# Patient Record
Sex: MALE | Race: ASIAN | NOT HISPANIC OR LATINO | URBAN - METROPOLITAN AREA
[De-identification: names, ages, dates, MRNs, and addresses within clinical notes are randomized per-mention and may not be internally consistent; named-entity substitution may affect disease eponyms.]

---

## 2024-03-06 ENCOUNTER — INPATIENT (INPATIENT)
Facility: HOSPITAL | Age: 84
LOS: 5 days | Discharge: HOME HEALTH | DRG: 281 | End: 2024-03-12
Attending: INTERNAL MEDICINE | Admitting: HOSPITALIST
Payer: MEDICARE

## 2024-03-06 VITALS
TEMPERATURE: 98 F | RESPIRATION RATE: 17 BRPM | HEART RATE: 68 BPM | OXYGEN SATURATION: 99 % | DIASTOLIC BLOOD PRESSURE: 62 MMHG | WEIGHT: 107.59 LBS | SYSTOLIC BLOOD PRESSURE: 116 MMHG

## 2024-03-06 LAB
ALBUMIN SERPL ELPH-MCNC: 4.2 G/DL — SIGNIFICANT CHANGE UP (ref 3.3–5)
ALP SERPL-CCNC: 63 U/L — SIGNIFICANT CHANGE UP (ref 40–120)
ALT FLD-CCNC: 53 U/L — HIGH (ref 10–45)
ANION GAP SERPL CALC-SCNC: 10 MMOL/L — SIGNIFICANT CHANGE UP (ref 5–17)
APTT BLD: >200 SEC — CRITICAL HIGH (ref 24.5–35.6)
AST SERPL-CCNC: 205 U/L — HIGH (ref 10–40)
BASOPHILS # BLD AUTO: 0.05 K/UL — SIGNIFICANT CHANGE UP (ref 0–0.2)
BASOPHILS NFR BLD AUTO: 0.5 % — SIGNIFICANT CHANGE UP (ref 0–2)
BILIRUB SERPL-MCNC: 0.9 MG/DL — SIGNIFICANT CHANGE UP (ref 0.2–1.2)
BUN SERPL-MCNC: 24 MG/DL — HIGH (ref 7–23)
CALCIUM SERPL-MCNC: 9.3 MG/DL — SIGNIFICANT CHANGE UP (ref 8.4–10.5)
CHLORIDE SERPL-SCNC: 105 MMOL/L — SIGNIFICANT CHANGE UP (ref 96–108)
CO2 SERPL-SCNC: 27 MMOL/L — SIGNIFICANT CHANGE UP (ref 22–31)
CREAT SERPL-MCNC: 1 MG/DL — SIGNIFICANT CHANGE UP (ref 0.5–1.3)
EGFR: 74 ML/MIN/1.73M2 — SIGNIFICANT CHANGE UP
EOSINOPHIL # BLD AUTO: 0.01 K/UL — SIGNIFICANT CHANGE UP (ref 0–0.5)
EOSINOPHIL NFR BLD AUTO: 0.1 % — SIGNIFICANT CHANGE UP (ref 0–6)
GLUCOSE SERPL-MCNC: 113 MG/DL — HIGH (ref 70–99)
HCT VFR BLD CALC: 45 % — SIGNIFICANT CHANGE UP (ref 39–50)
HGB BLD-MCNC: 14.6 G/DL — SIGNIFICANT CHANGE UP (ref 13–17)
IMM GRANULOCYTES NFR BLD AUTO: 0.2 % — SIGNIFICANT CHANGE UP (ref 0–0.9)
INR BLD: 1.15 RATIO — SIGNIFICANT CHANGE UP (ref 0.85–1.18)
LIDOCAIN IGE QN: 153 U/L — HIGH (ref 7–60)
LYMPHOCYTES # BLD AUTO: 1.66 K/UL — SIGNIFICANT CHANGE UP (ref 1–3.3)
LYMPHOCYTES # BLD AUTO: 17.5 % — SIGNIFICANT CHANGE UP (ref 13–44)
MAGNESIUM SERPL-MCNC: 2.3 MG/DL — SIGNIFICANT CHANGE UP (ref 1.6–2.6)
MCHC RBC-ENTMCNC: 30.4 PG — SIGNIFICANT CHANGE UP (ref 27–34)
MCHC RBC-ENTMCNC: 32.4 GM/DL — SIGNIFICANT CHANGE UP (ref 32–36)
MCV RBC AUTO: 93.8 FL — SIGNIFICANT CHANGE UP (ref 80–100)
MONOCYTES # BLD AUTO: 0.99 K/UL — HIGH (ref 0–0.9)
MONOCYTES NFR BLD AUTO: 10.4 % — SIGNIFICANT CHANGE UP (ref 2–14)
NEUTROPHILS # BLD AUTO: 6.77 K/UL — SIGNIFICANT CHANGE UP (ref 1.8–7.4)
NEUTROPHILS NFR BLD AUTO: 71.3 % — SIGNIFICANT CHANGE UP (ref 43–77)
NRBC # BLD: 0 /100 WBCS — SIGNIFICANT CHANGE UP (ref 0–0)
NT-PROBNP SERPL-SCNC: 3520 PG/ML — HIGH (ref 0–300)
PLATELET # BLD AUTO: 161 K/UL — SIGNIFICANT CHANGE UP (ref 150–400)
POTASSIUM SERPL-MCNC: 4 MMOL/L — SIGNIFICANT CHANGE UP (ref 3.5–5.3)
POTASSIUM SERPL-SCNC: 4 MMOL/L — SIGNIFICANT CHANGE UP (ref 3.5–5.3)
PROT SERPL-MCNC: 7.1 G/DL — SIGNIFICANT CHANGE UP (ref 6–8.3)
PROTHROM AB SERPL-ACNC: 12 SEC — SIGNIFICANT CHANGE UP (ref 9.5–13)
RBC # BLD: 4.8 M/UL — SIGNIFICANT CHANGE UP (ref 4.2–5.8)
RBC # FLD: 12.8 % — SIGNIFICANT CHANGE UP (ref 10.3–14.5)
SODIUM SERPL-SCNC: 142 MMOL/L — SIGNIFICANT CHANGE UP (ref 135–145)
TROPONIN T, HIGH SENSITIVITY RESULT: 5156 NG/L — HIGH (ref 0–51)
WBC # BLD: 9.5 K/UL — SIGNIFICANT CHANGE UP (ref 3.8–10.5)
WBC # FLD AUTO: 9.5 K/UL — SIGNIFICANT CHANGE UP (ref 3.8–10.5)

## 2024-03-06 PROCEDURE — 93308 TTE F-UP OR LMTD: CPT | Mod: 26

## 2024-03-06 PROCEDURE — 71045 X-RAY EXAM CHEST 1 VIEW: CPT | Mod: 26

## 2024-03-06 PROCEDURE — 99291 CRITICAL CARE FIRST HOUR: CPT

## 2024-03-06 RX ORDER — HEPARIN SODIUM 5000 [USP'U]/ML
INJECTION INTRAVENOUS; SUBCUTANEOUS
Qty: 25000 | Refills: 0 | Status: DISCONTINUED | OUTPATIENT
Start: 2024-03-06 | End: 2024-03-10

## 2024-03-06 RX ORDER — HEPARIN SODIUM 5000 [USP'U]/ML
2900 INJECTION INTRAVENOUS; SUBCUTANEOUS EVERY 6 HOURS
Refills: 0 | Status: DISCONTINUED | OUTPATIENT
Start: 2024-03-06 | End: 2024-03-10

## 2024-03-06 RX ORDER — CLOPIDOGREL BISULFATE 75 MG/1
300 TABLET, FILM COATED ORAL ONCE
Refills: 0 | Status: COMPLETED | OUTPATIENT
Start: 2024-03-06 | End: 2024-03-06

## 2024-03-06 RX ADMIN — CLOPIDOGREL BISULFATE 300 MILLIGRAM(S): 75 TABLET, FILM COATED ORAL at 20:24

## 2024-03-06 RX ADMIN — HEPARIN SODIUM 600 UNIT(S)/HR: 5000 INJECTION INTRAVENOUS; SUBCUTANEOUS at 20:22

## 2024-03-06 NOTE — CONSULT NOTE ADULT - ASSESSMENT
84 yo Cantonese speaking man with history of HLD who was transferred from [facility] for NICKY on EKG.     EKG: SR q waves in anterior precordial leads with NICKY in V2-v3 and submm NICKY in leads II, III, and aVF  EKG: SR Q waves in leads v2-v3 with resolved NICKY in those leads and submm NICKY in leads II, III, and aVF  Troponin: 5156  Loaded with ASA, plavix, and heparin gtt.     Presentation concerning for late presentation MI as symptoms started 2 days ago and EKG with Q waves and resolving NICKY. Will recommend treating as ACS at this time. There is no indication for emergent cath as he is not in shock, having arrhythmias and not having active chest pain.    Recommendations:  -continue ASA 81mg daily and plvix 75mg daily tomorrow   -continue atorvastatin 80mg daily   -continue heparin gtt (would continue although past 48 hours until thrombus ruled out)  -trend troponin, CK, CKMB to peak   -check formal TTE with definity to r/o thrombus   -will decide timing of LHC   -monitor on telemetry   -monitor lytes; maintain K>4, Mag >2  -check TSH, lipid profile, and A1c     Please see attending attestation for final recommendations.     Jamison Garcia MD  Cardiology Fellow  84 yo Cantonese speaking man with history of HLD who was transferred from [facility] for NICKY on EKG.     EKG: SR q waves in anterior precordial leads with NICKY in V2-v3 and submm NICKY in leads II, III, and aVF  EKG: SR Q waves in leads v2-v3 with resolved NICKY in those leads and submm NICKY in leads II, III, and aVF  Troponin: 5156  Loaded with ASA, plavix, and heparin gtt.     Presentation concerning for late presentation MI as symptoms started 2 days ago and EKG with Q waves and resolving NICKY. Will recommend treating as ACS at this time. There is no indication for emergent cath as he is not in shock, having arrhythmias and not having active chest pain.    Recommendations:  -continue ASA 81mg daily and plvix 75mg daily tomorrow   -continue atorvastatin 80mg daily   -continue heparin gtt (would continue although past 48 hours until thrombus ruled out)  -trend troponin, CK, CKMB to peak   -check formal TTE with definity to r/o thrombus   -will decide timing of LHC   -monitor on telemetry   -monitor lytes; maintain K>4, Mag >2  -check TSH, lipid profile, and A1c        Jamison Garcia MD  Cardiology Fellow

## 2024-03-06 NOTE — ED ADULT NURSE NOTE - NSFALLHARMRISKINTERV_ED_ALL_ED
Communicate risk of Fall with Harm to all staff, patient, and family/Provide patient with walking aids/Provide visual cue: red socks, yellow wristband, yellow gown, etc/Reinforce activity limits and safety measures with patient and family/Bed in lowest position, wheels locked, appropriate side rails in place/Call bell, personal items and telephone in reach/Instruct patient to call for assistance before getting out of bed/chair/stretcher/Non-slip footwear applied when patient is off stretcher/[place] to call system/Physically safe environment - no spills, clutter or unnecessary equipment/Purposeful Proactive Rounding/Room/bathroom lighting operational, light cord in reach

## 2024-03-06 NOTE — ED PROVIDER NOTE - CLINICAL SUMMARY MEDICAL DECISION MAKING FREE TEXT BOX
Audi Keita MD (PGY-3) elderly male, history of hypercholesterolemia, presented as STEMI transfer.  Limited history secondary to language and possible comprehension barriers.  His vital signs currently unremarkable, no clinical evidence of shock state.  He has ST segment elevation in his inferior leads with Q waves which may represent infarcted tissue.  POCUS ultrasound showed hypokinesis of the anterior wall extending into the anterior aspect of the septum.  Will trend troponin, continue medical therapy.  Will redose Plavix as he only got 300 at the outside facility, start heparin drip, aspirin already loaded.  Cardiology made aware.  Admission. Audi Keita MD (PGY-3) elderly male, history of hypercholesterolemia, presented as STEMI transfer.  Limited history secondary to language and possible comprehension barriers.  His vital signs currently unremarkable, no clinical evidence of shock state.  He has ST segment elevation in his inferior leads with Q waves which may represent infarcted tissue.  POCUS ultrasound showed hypokinesis of the anterior wall extending into the anterior aspect of the septum.  Will trend troponin, continue medical therapy.  Will redose Plavix as he only got 300 at the outside facility, start heparin drip, aspirin already loaded.  Cardiology made aware.  Admission.    Florencio: 83 yea rold male here with cp. transferred from [facility] for STEMI. patient with chest pain. mutiple canotnese interpeters used but states has no comprehension. loaded with plavix, asa, transferred for cards. ongoing cp. PE: att exam: patient awake alert NAD . LUNGS CTAB no wheeze no crackle. CARD RRR no m/r/g.  Abdomen soft NT ND no rebound no guarding no CVA tenderness. EXT WWP no edema no calf tenderness CV 2+DP/PT bilaterally. neuro A&Ox3 gait normal.  skin warm and dry no rash  plan: will get labs, cxr, ekg, cards, bedside echo, admit for cath. reasess

## 2024-03-06 NOTE — ED PROVIDER NOTE - OBJECTIVE STATEMENT
83-year-old male, presenting with a chief complaint of chest pain.  Very limited history, multiple Cantonese interpreters were utilized, he appears to understand Cantonese however stutters and his speech and does not provide a comprehensible history to the  services.  Per EMS, he presented with chest pain.  The report from the outside hospital was that he started having chest pain 2 days ago, saw his PCP had EKG abnormalities and was brought to [facility] via EMS.  Diagnosed with inferior STEMI, loaded with Plavix, aspirin and given heparin bolus, transferred here for specialized cardiology care, cath abilities.  Ongoing chest pain.  Review of systems otherwise unobtainable.  No known allergies to medications.  Unclear which medication he takes for his chronic comorbidities.

## 2024-03-06 NOTE — ED ADULT NURSE NOTE - OBJECTIVE STATEMENT
Patient is a 83 year old male transferred from [facility] for a positive STEMI. Patient had chest pain last night associated with SOB and vomiting went to his PCP this AM who sent him to the ER for abnormal EKG. Patient was given Aspirin 325mg, Plavix 300mg, Morphine IV push 2mg, and Heparin Bolus 3000 units. On assessment patient is A&Ox3, breathing comfortably on room air, no accessory muscle use, no cough, chest rise and fall equal, NSR on the cardiac monitor, no JVD, no edema noted, strong bilateral peripheral pulses, Abdomen is soft, nondistended, nontender, skin warm and normal for race. Patient denies headache, dizziness, chest pain, palpitations, cough, SOB, abdominal pain, n/v/d, urinary symptoms, fevers, chills, weakness at this time.

## 2024-03-06 NOTE — ED PROVIDER NOTE - PHYSICAL EXAMINATION
Gen: NAD, non-toxic appearing  Head: normal appearing  HEENT: normal conjunctiva  Lung: no respiratory distress, speaking in full sentences, ctab      CV: regular rate and rhythm, no murmurs  Abd: soft, non distended, non tender   MSK: no visible deformities  Neuro: alert and grossly oriented, no gross motor deficits  Skin: No king rashes

## 2024-03-06 NOTE — CONSULT NOTE ADULT - SUBJECTIVE AND OBJECTIVE BOX
HPI:  84 yo Cantonese speaking man with history of HLD who was transferred from [facility] for NICKY on EKG.     Daughter present for encounter.   Reports intermittent chest pain that started 2 days prior. Midsternal radiated to back. Squeezing, pressure like pain. Came on with rest and with exertion. Lasted minutes at a time. Not worsened by exertion. No known aggravating/relieving factors. 1 episode of NBNB emesis yesterday. Denies palpitations, orthopnea, SOB, diaphoresis, falls, and passing out. 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 Remote history of pipe tobacco use. Denies recreational drug use and alcohol use. Only takes atorvastatin and laxatives at home. No prior known cardiac hx.      At [facility] VSS. EKS with SR q waves in anterior precordial leads with NICKY in V2-v3 and submm NICKY in leads II, III, and aVF.   Loaded with ASA, plavix 300mg, and heparin gtt. Given morphine for pain.   Transferred for cath to NS.     Upon arrival to [facility] VSS. Given additional 300mg plavix. continued on heparin gtt. POCUS done by ED with anterior wall and raffy-septum hypokinetic. No chest pain.   Repeat EKG with SR Q waves in leads v2-v3 with resolved NICKY in those leads and submm NICKY in leads II, III, and aVF.      Current Medications:   heparin   Injectable 2900 Unit(s) IV Push every 6 hours PRN  heparin  Infusion.  Unit(s)/Hr IV Continuous <Continuous>      REVIEW OF SYSTEMS:  as above.     Physical Exam:  T(F): 98.2 (03-06), Max: 98.2 (03-06)  HR: 69 (03-06) (68 - 69)  BP: 119/62 (03-06) (116/62 - 119/62)  RR: --  SpO2: --    GENERAL: No acute distress, well-developed  HEAD:  Atraumatic, Normocephalic  ENT: EOMI, PERRLA, conjunctiva and sclera clear, Neck supple, No JVD, moist mucosa  CHEST/LUNG: Clear to auscultation bilaterally; No wheeze, equal breath sounds bilaterally   BACK: No spinal tenderness  HEART: Regular rate and rhythm; No murmurs, rubs, or gallops  ABDOMEN: Soft, Nontender, Nondistended; Bowel sounds present  EXTREMITIES:  No clubbing, cyanosis, or edema  PSYCH: Nl behavior, nl affect  NEUROLOGY: AAOx3, non-focal, cranial nerves intact  SKIN: Normal color, No rashes or lesions  LINES:    Imaging:    CXR: Personally reviewed    Labs: Personally reviewed                        14.6   9.50  )-----------( 161      ( 06 Mar 2024 20:12 )             45.0     03-06    142  |  105  |  24<H>  ----------------------------<  113<H>  4.0   |  27  |  1.00    Ca    9.3      06 Mar 2024 20:12  Mg     2.3     03-06    TPro  7.1  /  Alb  4.2  /  TBili  0.9  /  DBili  x   /  AST  205<H>  /  ALT  53<H>  /  AlkPhos  63  03-06    PT/INR - ( 06 Mar 2024 20:12 )   PT: 12.0 sec;   INR: 1.15 ratio             CARDIAC MARKERS ( 06 Mar 2024 20:12 )  5156 ng/L / x     / x     / x     / x     / x                     HPI:  82 yo Cantonese speaking man with history of HLD who was transferred from [facility] for NICKY on EKG.     Daughter present for encounter.   Reports intermittent chest pain that started 2 days prior. Midsternal radiated to back. Squeezing, pressure like pain. Came on with rest and with exertion. Lasted minutes at a time. Not worsened by exertion. No known aggravating/relieving factors. 1 episode of NBNB emesis yesterday. Denies palpitations, orthopnea, SOB, diaphoresis, falls, and passing out. 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 Remote history of pipe tobacco use. Denies recreational drug use and alcohol use. Only takes atorvastatin and laxatives at home. No prior known cardiac hx.      At [facility] VSS. EKS with SR q waves in anterior precordial leads with NICKY in V2-v3 and submm NICKY in leads II, III, and aVF.   Loaded with ASA, plavix 300mg, and heparin gtt. Given morphine for pain.   Transferred for cath to NS.     Upon arrival to [facility] VSS. Given additional 300mg plavix. continued on heparin gtt. POCUS done by ED with anterior wall and raffy-septum hypokinetic. No chest pain.   Repeat EKG with SR Q waves in leads v2-v3 with resolved NICKY in those leads and submm NICKY in leads II, III, and aVF.      Current Medications:   heparin   Injectable 2900 Unit(s) IV Push every 6 hours PRN  heparin  Infusion.  Unit(s)/Hr IV Continuous <Continuous>      REVIEW OF SYSTEMS:  CONSTITUTIONAL: no fevers or chills  EYES/ENT: No visual changes;  No vertigo or throat pain   NECK: No pain or stiffness  RESPIRATORY: No cough, wheezing. No shortness of breath  CARDIOVASCULAR: No chest pain or palpitations  GASTROINTESTINAL: No abdominal or epigastric pain. No nausea, vomiting. No diarrhea. No melena.  GENITOURINARY: No dysuria, frequency or hematuria  NEUROLOGICAL: No numbness or weakness  SKIN: No itching, burning, rashes, or lesions   All other review of systems is negative unless indicated above. .     Physical Exam:  T(F): 98.2 (03-06), Max: 98.2 (03-06)  HR: 69 (03-06) (68 - 69)  BP: 119/62 (03-06) (116/62 - 119/62)  RR: --  SpO2: --    GENERAL: No acute distress, well-developed  HEAD:  Atraumatic, Normocephalic  ENT: EOMI, PERRLA, conjunctiva and sclera clear, Neck supple, No JVD, moist mucosa  CHEST/LUNG: Clear to auscultation bilaterally; No wheeze, equal breath sounds bilaterally   BACK: No spinal tenderness  HEART: Regular rate and rhythm; No murmurs, rubs, or gallops  ABDOMEN: Soft, Nontender, Nondistended; Bowel sounds present  EXTREMITIES:  No clubbing, cyanosis, or edema  PSYCH: Nl behavior, nl affect  NEUROLOGY: AAOx3, non-focal, cranial nerves intact  SKIN: Normal color, No rashes or lesions  LINES:    Imaging:    CXR: Personally reviewed    Labs: Personally reviewed                        14.6   9.50  )-----------( 161      ( 06 Mar 2024 20:12 )             45.0     03-06    142  |  105  |  24<H>  ----------------------------<  113<H>  4.0   |  27  |  1.00    Ca    9.3      06 Mar 2024 20:12  Mg     2.3     03-06    TPro  7.1  /  Alb  4.2  /  TBili  0.9  /  DBili  x   /  AST  205<H>  /  ALT  53<H>  /  AlkPhos  63  03-06    PT/INR - ( 06 Mar 2024 20:12 )   PT: 12.0 sec;   INR: 1.15 ratio             CARDIAC MARKERS ( 06 Mar 2024 20:12 )  5156 ng/L / x     / x     / x     / x     / x

## 2024-03-06 NOTE — CONSULT NOTE ADULT - ATTENDING COMMENTS
83 year old man transferred from [facility], late presentation MI, symptoms resolved prior to transfer, troponin appears to have peaked and is declining. He is in no distress, lungs are clear, cardiac rhythm regular without murmurs and there is no edema. EKG consistent with anterior wall STEMI. On Heparin infusion, loaded with aspirin and ticagrelor. Plan cardiac echo then coronary angiography.    To contact call Cardiology Fellow or Attending as listed on [email or web address] password: [email or web address]. 83 year old man transferred from [facility], late presentation MI, symptoms resolved prior to transfer, troponin appears to have peaked and is declining. He is in no distress, lungs are clear, cardiac rhythm regular without murmurs and there is no edema. EKG consistent with anterior wall STEMI. On Heparin infusion, loaded with aspirin and clopidogrel. Plan cardiac echo then coronary angiography.    To contact call Cardiology Fellow or Attending as listed on [email or web address] password: [email or web address].

## 2024-03-06 NOTE — ED PROVIDER NOTE - PROGRESS NOTE DETAILS
Audi Keita MD (PGY-3)  cardiology evaluated, no plan for This evening.  Continue medical therapy.  Telemetry bed admission.

## 2024-03-07 DIAGNOSIS — I24.9 ACUTE ISCHEMIC HEART DISEASE, UNSPECIFIED: ICD-10-CM

## 2024-03-07 DIAGNOSIS — Z79.899 OTHER LONG TERM (CURRENT) DRUG THERAPY: ICD-10-CM

## 2024-03-07 DIAGNOSIS — I21.3 ST ELEVATION (STEMI) MYOCARDIAL INFARCTION OF UNSPECIFIED SITE: ICD-10-CM

## 2024-03-07 LAB
A1C WITH ESTIMATED AVERAGE GLUCOSE RESULT: 6.1 % — HIGH (ref 4–5.6)
ADD ON TEST-SPECIMEN IN LAB: SIGNIFICANT CHANGE UP
ALBUMIN SERPL ELPH-MCNC: 3.9 G/DL — SIGNIFICANT CHANGE UP (ref 3.3–5)
ALP SERPL-CCNC: 58 U/L — SIGNIFICANT CHANGE UP (ref 40–120)
ALT FLD-CCNC: 43 U/L — SIGNIFICANT CHANGE UP (ref 10–45)
ANION GAP SERPL CALC-SCNC: 8 MMOL/L — SIGNIFICANT CHANGE UP (ref 5–17)
APTT BLD: 102 SEC — HIGH (ref 24.5–35.6)
APTT BLD: 43.4 SEC — HIGH (ref 24.5–35.6)
APTT BLD: 54.6 SEC — HIGH (ref 24.5–35.6)
AST SERPL-CCNC: 141 U/L — HIGH (ref 10–40)
BASOPHILS # BLD AUTO: 0.04 K/UL — SIGNIFICANT CHANGE UP (ref 0–0.2)
BASOPHILS NFR BLD AUTO: 0.6 % — SIGNIFICANT CHANGE UP (ref 0–2)
BILIRUB SERPL-MCNC: 1.3 MG/DL — HIGH (ref 0.2–1.2)
BUN SERPL-MCNC: 22 MG/DL — SIGNIFICANT CHANGE UP (ref 7–23)
CALCIUM SERPL-MCNC: 9.3 MG/DL — SIGNIFICANT CHANGE UP (ref 8.4–10.5)
CHLORIDE SERPL-SCNC: 106 MMOL/L — SIGNIFICANT CHANGE UP (ref 96–108)
CHOLEST SERPL-MCNC: 146 MG/DL — SIGNIFICANT CHANGE UP
CK SERPL-CCNC: 851 U/L — HIGH (ref 30–200)
CO2 SERPL-SCNC: 26 MMOL/L — SIGNIFICANT CHANGE UP (ref 22–31)
CREAT SERPL-MCNC: 1.05 MG/DL — SIGNIFICANT CHANGE UP (ref 0.5–1.3)
EGFR: 70 ML/MIN/1.73M2 — SIGNIFICANT CHANGE UP
EOSINOPHIL # BLD AUTO: 0.02 K/UL — SIGNIFICANT CHANGE UP (ref 0–0.5)
EOSINOPHIL NFR BLD AUTO: 0.3 % — SIGNIFICANT CHANGE UP (ref 0–6)
ESTIMATED AVERAGE GLUCOSE: 128 MG/DL — HIGH (ref 68–114)
GLUCOSE SERPL-MCNC: 99 MG/DL — SIGNIFICANT CHANGE UP (ref 70–99)
HCT VFR BLD CALC: 40.4 % — SIGNIFICANT CHANGE UP (ref 39–50)
HCT VFR BLD CALC: 40.8 % — SIGNIFICANT CHANGE UP (ref 39–50)
HDLC SERPL-MCNC: 56 MG/DL — SIGNIFICANT CHANGE UP
HGB BLD-MCNC: 12.8 G/DL — LOW (ref 13–17)
HGB BLD-MCNC: 13.4 G/DL — SIGNIFICANT CHANGE UP (ref 13–17)
IMM GRANULOCYTES NFR BLD AUTO: 0.6 % — SIGNIFICANT CHANGE UP (ref 0–0.9)
INR BLD: 1.07 RATIO — SIGNIFICANT CHANGE UP (ref 0.85–1.18)
LIPID PNL WITH DIRECT LDL SERPL: 76 MG/DL — SIGNIFICANT CHANGE UP
LYMPHOCYTES # BLD AUTO: 1.46 K/UL — SIGNIFICANT CHANGE UP (ref 1–3.3)
LYMPHOCYTES # BLD AUTO: 20.9 % — SIGNIFICANT CHANGE UP (ref 13–44)
MAGNESIUM SERPL-MCNC: 2.3 MG/DL — SIGNIFICANT CHANGE UP (ref 1.6–2.6)
MCHC RBC-ENTMCNC: 30 PG — SIGNIFICANT CHANGE UP (ref 27–34)
MCHC RBC-ENTMCNC: 30.6 PG — SIGNIFICANT CHANGE UP (ref 27–34)
MCHC RBC-ENTMCNC: 31.7 GM/DL — LOW (ref 32–36)
MCHC RBC-ENTMCNC: 32.8 GM/DL — SIGNIFICANT CHANGE UP (ref 32–36)
MCV RBC AUTO: 93.2 FL — SIGNIFICANT CHANGE UP (ref 80–100)
MCV RBC AUTO: 94.8 FL — SIGNIFICANT CHANGE UP (ref 80–100)
MONOCYTES # BLD AUTO: 0.68 K/UL — SIGNIFICANT CHANGE UP (ref 0–0.9)
MONOCYTES NFR BLD AUTO: 9.7 % — SIGNIFICANT CHANGE UP (ref 2–14)
NEUTROPHILS # BLD AUTO: 4.76 K/UL — SIGNIFICANT CHANGE UP (ref 1.8–7.4)
NEUTROPHILS NFR BLD AUTO: 67.9 % — SIGNIFICANT CHANGE UP (ref 43–77)
NON HDL CHOLESTEROL: 90 MG/DL — SIGNIFICANT CHANGE UP
NRBC # BLD: 0 /100 WBCS — SIGNIFICANT CHANGE UP (ref 0–0)
NRBC # BLD: 0 /100 WBCS — SIGNIFICANT CHANGE UP (ref 0–0)
PHOSPHATE SERPL-MCNC: 2.1 MG/DL — LOW (ref 2.5–4.5)
PLATELET # BLD AUTO: 134 K/UL — LOW (ref 150–400)
PLATELET # BLD AUTO: 146 K/UL — LOW (ref 150–400)
POTASSIUM SERPL-MCNC: 4.4 MMOL/L — SIGNIFICANT CHANGE UP (ref 3.5–5.3)
POTASSIUM SERPL-SCNC: 4.4 MMOL/L — SIGNIFICANT CHANGE UP (ref 3.5–5.3)
PROT SERPL-MCNC: 6.5 G/DL — SIGNIFICANT CHANGE UP (ref 6–8.3)
PROTHROM AB SERPL-ACNC: 11.7 SEC — SIGNIFICANT CHANGE UP (ref 9.5–13)
RBC # BLD: 4.26 M/UL — SIGNIFICANT CHANGE UP (ref 4.2–5.8)
RBC # BLD: 4.38 M/UL — SIGNIFICANT CHANGE UP (ref 4.2–5.8)
RBC # FLD: 12.8 % — SIGNIFICANT CHANGE UP (ref 10.3–14.5)
RBC # FLD: 12.9 % — SIGNIFICANT CHANGE UP (ref 10.3–14.5)
SODIUM SERPL-SCNC: 140 MMOL/L — SIGNIFICANT CHANGE UP (ref 135–145)
TRIGL SERPL-MCNC: 73 MG/DL — SIGNIFICANT CHANGE UP
TROPONIN T, HIGH SENSITIVITY RESULT: 4083 NG/L — HIGH (ref 0–51)
TSH SERPL-MCNC: 1.48 UIU/ML — SIGNIFICANT CHANGE UP (ref 0.27–4.2)
WBC # BLD: 7 K/UL — SIGNIFICANT CHANGE UP (ref 3.8–10.5)
WBC # BLD: 8.02 K/UL — SIGNIFICANT CHANGE UP (ref 3.8–10.5)
WBC # FLD AUTO: 7 K/UL — SIGNIFICANT CHANGE UP (ref 3.8–10.5)
WBC # FLD AUTO: 8.02 K/UL — SIGNIFICANT CHANGE UP (ref 3.8–10.5)

## 2024-03-07 PROCEDURE — 99223 1ST HOSP IP/OBS HIGH 75: CPT

## 2024-03-07 PROCEDURE — 99223 1ST HOSP IP/OBS HIGH 75: CPT | Mod: GC

## 2024-03-07 PROCEDURE — 93306 TTE W/DOPPLER COMPLETE: CPT | Mod: 26

## 2024-03-07 RX ORDER — POLYETHYLENE GLYCOL 3350 17 G/17G
17 POWDER, FOR SOLUTION ORAL
Refills: 0 | DISCHARGE

## 2024-03-07 RX ORDER — SENNA PLUS 8.6 MG/1
2 TABLET ORAL
Refills: 0 | DISCHARGE

## 2024-03-07 RX ORDER — ACETAMINOPHEN 500 MG
650 TABLET ORAL EVERY 6 HOURS
Refills: 0 | Status: DISCONTINUED | OUTPATIENT
Start: 2024-03-07 | End: 2024-03-12

## 2024-03-07 RX ORDER — NITROGLYCERIN 6.5 MG
0.4 CAPSULE, EXTENDED RELEASE ORAL
Refills: 0 | Status: DISCONTINUED | OUTPATIENT
Start: 2024-03-07 | End: 2024-03-07

## 2024-03-07 RX ORDER — WARFARIN SODIUM 2.5 MG/1
5 TABLET ORAL ONCE
Refills: 0 | Status: COMPLETED | OUTPATIENT
Start: 2024-03-07 | End: 2024-03-07

## 2024-03-07 RX ORDER — ONDANSETRON 8 MG/1
4 TABLET, FILM COATED ORAL EVERY 8 HOURS
Refills: 0 | Status: DISCONTINUED | OUTPATIENT
Start: 2024-03-07 | End: 2024-03-12

## 2024-03-07 RX ORDER — POLYETHYLENE GLYCOL 3350 17 G/17G
17 POWDER, FOR SOLUTION ORAL DAILY
Refills: 0 | Status: DISCONTINUED | OUTPATIENT
Start: 2024-03-07 | End: 2024-03-12

## 2024-03-07 RX ORDER — ATORVASTATIN CALCIUM 80 MG/1
80 TABLET, FILM COATED ORAL AT BEDTIME
Refills: 0 | Status: DISCONTINUED | OUTPATIENT
Start: 2024-03-07 | End: 2024-03-12

## 2024-03-07 RX ORDER — TAMSULOSIN HYDROCHLORIDE 0.4 MG/1
0.4 CAPSULE ORAL AT BEDTIME
Refills: 0 | Status: DISCONTINUED | OUTPATIENT
Start: 2024-03-07 | End: 2024-03-12

## 2024-03-07 RX ORDER — POTASSIUM PHOSPHATE, MONOBASIC POTASSIUM PHOSPHATE, DIBASIC 236; 224 MG/ML; MG/ML
15 INJECTION, SOLUTION INTRAVENOUS ONCE
Refills: 0 | Status: COMPLETED | OUTPATIENT
Start: 2024-03-07 | End: 2024-03-07

## 2024-03-07 RX ORDER — SENNA PLUS 8.6 MG/1
2 TABLET ORAL AT BEDTIME
Refills: 0 | Status: DISCONTINUED | OUTPATIENT
Start: 2024-03-07 | End: 2024-03-12

## 2024-03-07 RX ORDER — LANOLIN ALCOHOL/MO/W.PET/CERES
3 CREAM (GRAM) TOPICAL AT BEDTIME
Refills: 0 | Status: DISCONTINUED | OUTPATIENT
Start: 2024-03-07 | End: 2024-03-12

## 2024-03-07 RX ORDER — CLOPIDOGREL BISULFATE 75 MG/1
75 TABLET, FILM COATED ORAL DAILY
Refills: 0 | Status: DISCONTINUED | OUTPATIENT
Start: 2024-03-07 | End: 2024-03-12

## 2024-03-07 RX ORDER — ASPIRIN/CALCIUM CARB/MAGNESIUM 324 MG
81 TABLET ORAL DAILY
Refills: 0 | Status: DISCONTINUED | OUTPATIENT
Start: 2024-03-07 | End: 2024-03-08

## 2024-03-07 RX ADMIN — POLYETHYLENE GLYCOL 3350 17 GRAM(S): 17 POWDER, FOR SOLUTION ORAL at 12:27

## 2024-03-07 RX ADMIN — SENNA PLUS 2 TABLET(S): 8.6 TABLET ORAL at 21:06

## 2024-03-07 RX ADMIN — HEPARIN SODIUM 450 UNIT(S)/HR: 5000 INJECTION INTRAVENOUS; SUBCUTANEOUS at 07:06

## 2024-03-07 RX ADMIN — Medication 81 MILLIGRAM(S): at 12:27

## 2024-03-07 RX ADMIN — HEPARIN SODIUM 550 UNIT(S)/HR: 5000 INJECTION INTRAVENOUS; SUBCUTANEOUS at 14:42

## 2024-03-07 RX ADMIN — HEPARIN SODIUM 450 UNIT(S)/HR: 5000 INJECTION INTRAVENOUS; SUBCUTANEOUS at 04:10

## 2024-03-07 RX ADMIN — WARFARIN SODIUM 5 MILLIGRAM(S): 2.5 TABLET ORAL at 21:06

## 2024-03-07 RX ADMIN — Medication 3 MILLIGRAM(S): at 21:06

## 2024-03-07 RX ADMIN — HEPARIN SODIUM 550 UNIT(S)/HR: 5000 INJECTION INTRAVENOUS; SUBCUTANEOUS at 23:20

## 2024-03-07 RX ADMIN — POTASSIUM PHOSPHATE, MONOBASIC POTASSIUM PHOSPHATE, DIBASIC 62.5 MILLIMOLE(S): 236; 224 INJECTION, SOLUTION INTRAVENOUS at 12:28

## 2024-03-07 RX ADMIN — CLOPIDOGREL BISULFATE 75 MILLIGRAM(S): 75 TABLET, FILM COATED ORAL at 12:27

## 2024-03-07 RX ADMIN — ATORVASTATIN CALCIUM 80 MILLIGRAM(S): 80 TABLET, FILM COATED ORAL at 21:06

## 2024-03-07 RX ADMIN — HEPARIN SODIUM 0 UNIT(S)/HR: 5000 INJECTION INTRAVENOUS; SUBCUTANEOUS at 03:04

## 2024-03-07 RX ADMIN — TAMSULOSIN HYDROCHLORIDE 0.4 MILLIGRAM(S): 0.4 CAPSULE ORAL at 21:06

## 2024-03-07 NOTE — H&P ADULT - PROBLEM SELECTOR PLAN 1
currently still with some chest pain; ekg with q waves and resolving panfilo in ant precordial leads v2-v4 + sub mm panfilo in inferior limb leads ii, iii, avf; trop ~5k (also the likely source of transaminitis)  clinically euvolemic; bnp ~3500; cxr prelim read w clear lungs; pocus in er shows septal and anterior wma  otherwise, in no respiratory distress w adequate spo2 at room air  s/p dapt load + ufh infusion at osh and [facility] er  cards consulted by er, "will decide timing of LHC"  follow up 2nd trop, cpk, formal tte, lipid profile, a1c  Monitor for chest pain, telemetry/EKG changes  trend volume status via I/Os, daily weights  cont dapt, high intensity statin  cont ufh infusion  prn nitroglycerin sl for recurrent chest pain  cards follow up in am

## 2024-03-07 NOTE — CHART NOTE - NSCHARTNOTEFT_GEN_A_CORE
See complete consult note from 3/6. 83 year old man transferred from [facility], late presentation MI, symptoms resolved prior to transfer, troponin appears to have peaked and is declining. He is in no distress, lungs are clear, cardiac rhythm regular without murmurs and there is no edema. EKG consistent with anterior wall STEMI. On Heparin infusion, loaded with aspirin and clopidogrel. Plan cardiac echo this morning then coronary angiography.

## 2024-03-07 NOTE — CHART NOTE - NSCHARTNOTEFT_GEN_A_CORE
TTE resulted with EF 40%, SWMAs, apical LV thrombus. Discussed case with interventional attending Dr Awad, due to patients age, late presentation, and lack of current symptoms, arrhythmias, or shock, risk of mechanical complications from LHC/PCI likely outweighs benefits of medical management. Dr Awad recommends differing LHC and treating medically.    Plan:  - Cont DAPT for now and heparin gtt. Likely transition to Warfarin for LV thrombus  - Cont high intensity statin  - Introduction of GDMT as BP and HR allow. Currently with borderline vitals, will hold off on starting GDMT pending clinical course    Nader Jose MD  Cardiology Fellow TTE resulted with EF 40%, SWMAs, apical LV thrombus. Discussed case with interventional attending Dr Awad, due to patients age, late presentation, and lack of current symptoms, arrhythmias, or shock, risk of mechanical complications from LHC/PCI likely outweighs benefits of medical management. Dr Awad recommends differing LHC and treating medically.    Plan:  - Cont DAPT for now and heparin gtt. Likely transition to Warfarin for LV thrombus. After 48 hr of triple therapy, stop ASA, patient to continue on Plavix and AC with Warfarin/heparin bridge  - Cont high intensity statin  - Introduction of GDMT as BP and HR allow. Currently with borderline vitals, will hold off on starting GDMT pending clinical course    Nader Jose MD  Cardiology Fellow

## 2024-03-07 NOTE — H&P ADULT - TIME BILLING
- Ordering, reviewing, and interpreting labs, testing, and imaging.  - Independently obtaining a review of systems and performing a physical exam  - Reviewing consultant documentation/recommendations

## 2024-03-07 NOTE — H&P ADULT - NSHPPHYSICALEXAM_GEN_ALL_CORE
T(C): 36.7 (03-07-24 @ 00:01), Max: 36.8 (03-06-24 @ 20:08)  HR: 68 (03-07-24 @ 00:01) (68 - 69)  BP: 99/57 (03-07-24 @ 00:01) (99/57 - 119/62)  RR: 17 (03-07-24 @ 00:01) (17 - 17)  SpO2: 99% (03-07-24 @ 00:01) (99% - 99%)  GENERAL: NAD, lying in bed   EYES: EOMI, PERRLA; conjunctiva and sclera clear  ENMT: Moist oral mucosa, no pharyngeal injection or exudates   NECK: Supple, no palpable masses; no JVD  RESPIRATORY: Normal respiratory effort; lungs are clear to auscultation bilaterally  CARDIOVASCULAR: Regular rate and rhythm, normal S1 and S2, no murmur/rub/gallop; No lower extremity edema; Peripheral pulses are 2+ bilaterally  ABDOMEN: Nontender to palpation, normoactive bowel sounds, no rebound/guarding;   MUSCULOSKELETAL: no joint swelling or tenderness to palpation  PSYCH: A+O to person, place, and time; affect appropriate  NEUROLOGY: CN 2-12 are intact and symmetric; no gross motor or sensory deficits   SKIN: No rashes; no palpable lesions

## 2024-03-07 NOTE — PHYSICAL THERAPY INITIAL EVALUATION ADULT - LEVEL OF INDEPENDENCE: SUPINE/SIT, REHAB EVAL
Patient would like to become a new patient of Dr. Foster.    His fiance (Charisma Bolton (independent

## 2024-03-07 NOTE — OCCUPATIONAL THERAPY INITIAL EVALUATION ADULT - PERTINENT HX OF CURRENT PROBLEM, REHAB EVAL
82yo 50kg m w pmh hld presented to osh with chest pain; reportedly, started a couple of days ago, substernal, radiating to back, characterized as squeezing, occurring even at rest. family grew concerned so had patient go to osh er; at osh er, found to have findings suggestive of stemi (ekg w panfilo in anterior precordial leads + elevated troponin); patient was loaded w dapt, started on ufh infusion, and transferred to [facility] er for further mgmt; in er, c/f late presentation stemi (repeat ekg w q waves and resolving panfilo in ant precordial leads v2-v4 + elevated troponin); admit to medicine for further mgmt.   ER TTE LIMITED- No Pericardial Effusion. XRAY Chest (-).

## 2024-03-07 NOTE — H&P ADULT - ASSESSMENT
82yo 50kg m w pmh hld presented to osh with chest pain; at osh er, found to have findings suggestive of stemi (ekg w panfilo in anterior precordial leads + elevated troponin); loaded w dapt, started on ufh infusion, and transferred to [facility] er for further mgmt; in er, c/f late presentation stemi (repeat ekg w q waves and resolving panfilo in ant precordial leads v2-v3 + elevated troponin); admit to medicine for further mgmt 84yo 50kg m w pmh hld presented to osh with chest pain; at osh er, found to have findings suggestive of stemi (ekg w panfilo in anterior precordial leads + elevated troponin); loaded w dapt, started on ufh infusion, and transferred to [facility] er for further mgmt; in er, c/f late presentation stemi (repeat ekg w q waves and resolving panfilo in ant precordial leads v2-v4 + elevated troponin); admit to medicine for further mgmt

## 2024-03-07 NOTE — OCCUPATIONAL THERAPY INITIAL EVALUATION ADULT - ADDITIONAL COMMENTS
Pt resides in an apartment on 6th floor with his family(+) elevator access. PTA, pt was independent with all mobility/ADLs. Pt does not use/own any DME.

## 2024-03-07 NOTE — PHYSICAL THERAPY INITIAL EVALUATION ADULT - ADDITIONAL COMMENTS
Pt resides in an apartment with his family. (+) elevator access. PTA, pt was independent with all mobility/ADLs. Pt does not use/own any DME.

## 2024-03-07 NOTE — CHART NOTE - NSCHARTNOTEFT_GEN_A_CORE
For full details see H&P from earlier today.    83 year old male with PMH HLD and BPH who presented with chest pain. He initially went to an outside hospital and found to have late presenting STEMI and was transferred to [facility]. He was evaluated by cardiology but due to age, late presentation and patient's improvement tin symptoms risks of LHC outweigh the benefits so he will be medically managed. He was also found to have an LV thrombus, will continue with heparin drip as a bridge to Warfarin. Will continue with DAPT and high intensity statin. Will introduce GDMT as tolerated.

## 2024-03-07 NOTE — H&P ADULT - NSHPREVIEWOFSYSTEMS_GEN_ALL_CORE
CONSTITUTIONAL: No fever. no weakness  ENMT:  No sinus or throat pain  RESPIRATORY: No cough, wheezing, chills or hemoptysis; No shortness of breath  CARDIOVASCULAR: + chest pain, no palpitations, dizziness, or leg swelling  GASTROINTESTINAL: No abdominal or epigastric pain. No nausea, vomiting, or hematemesis; No diarrhea or constipation. No melena or hematochezia.  GENITOURINARY: No dysuria or incontinence  NEUROLOGICAL: No headaches, memory loss, loss of strength, numbness, or tremors  SKIN: No rashes,  No hives or eczema  ENDOCRINE: No heat or cold intolerance; No hair loss  MUSCULOSKELETAL: No joint pain or swelling; No muscle, back, or extremity pain  PSYCHIATRIC: No depression, anxiety, mood swings, or difficulty sleeping  HEME/LYMPH: No easy bruising, or bleeding gums; no enlarged LN

## 2024-03-07 NOTE — OCCUPATIONAL THERAPY INITIAL EVALUATION ADULT - VISUAL ACUITY
no visual impairment noted pt. able to navigate environment safely ,[email or web address]

## 2024-03-07 NOTE — PHYSICAL THERAPY INITIAL EVALUATION ADULT - PERTINENT HX OF CURRENT PROBLEM, REHAB EVAL
84yo 50kg m w pmh hld presented to [facility] with chest pain; reportedly, started a couple of days ago, substernal, radiating to back, characterized as squeezing, occurring even at rest. family grew concerned so had patient go to osh er; at osh er, found to have findings suggestive of stemi (ekg w panfilo in anterior precordial leads + elevated troponin); patient was loaded w dapt, started on ufh infusion, and transferred to [facility] er for further mgmt; in er, c/f late presentation stemi (repeat ekg w q waves and resolving panfilo in ant precordial leads v2-v4 + elevated troponin); admit to medicine for further mgmt. TTE (-) for pericardial effusion.

## 2024-03-07 NOTE — PATIENT PROFILE ADULT - FALL HARM RISK - HARM RISK INTERVENTIONS
Assistance with ambulation/Assistance OOB with selected safe patient handling equipment/Communicate Risk of Fall with Harm to all staff/Discuss with provider need for PT consult/Monitor gait and stability/Provide patient with walking aids - walker, cane, crutches/Reinforce activity limits and safety measures with patient and family/Tailored Fall Risk Interventions/Use of alarms - bed, chair and/or voice tab/Visual Cue: Yellow wristband and red socks/Bed in lowest position, wheels locked, appropriate side rails in place/Call bell, personal items and telephone in reach/Instruct patient to call for assistance before getting out of bed or chair/Non-slip footwear when patient is out of bed/[place] to call system/Physically safe environment - no spills, clutter or unnecessary equipment/Purposeful Proactive Rounding/Room/bathroom lighting operational, light cord in reach

## 2024-03-07 NOTE — H&P ADULT - HISTORY OF PRESENT ILLNESS
82yo 50kg m w pmh hld presented to osh with chest pain; reportedly, started a couple of days ago, substernal, radiating to back, characterized as squeezing, occurring even at rest. family grew concerned so had patient go to osh er; at osh er, found to have findings suggestive of stemi (ekg w panfilo in anterior precordial leads + elevated troponin); patient was loaded w dapt, started on ufh infusion, and transferred to [facility] er for further mgmt; in er, c/f late presentation stemi (repeat ekg w q waves and resolving panfilo in ant precordial leads v2-v3 + elevated troponin); admit to medicine for further mgmt        82yo 50kg m w pmh hld presented to o[facility] with chest pain; reportedly, started a couple of days ago, substernal, radiating to back, characterized as squeezing, occurring even at rest. family grew concerned so had patient go to osh er; at osh er, found to have findings suggestive of stemi (ekg w panfilo in anterior precordial leads + elevated troponin); patient was loaded w dapt, started on ufh infusion, and transferred to [facility] er for further mgmt; in er, c/f late presentation stemi (repeat ekg w q waves and resolving panfilo in ant precordial leads v2-v3 + elevated troponin); admit to medicine for further mgmt 82yo 50kg m w pmh hld presented to o[facility] with chest pain; reportedly, started a couple of days ago, substernal, radiating to back, characterized as squeezing, occurring even at rest. family grew concerned so had patient go to osh er; at osh er, found to have findings suggestive of stemi (ekg w panfilo in anterior precordial leads + elevated troponin); patient was loaded w dapt, started on ufh infusion, and transferred to [facility] er for further mgmt; in er, c/f late presentation stemi (repeat ekg w q waves and resolving panfilo in ant precordial leads v2-v4 + elevated troponin); admit to medicine for further mgmt

## 2024-03-08 DIAGNOSIS — I51.3 INTRACARDIAC THROMBOSIS, NOT ELSEWHERE CLASSIFIED: ICD-10-CM

## 2024-03-08 DIAGNOSIS — N40.0 BENIGN PROSTATIC HYPERPLASIA WITHOUT LOWER URINARY TRACT SYMPTOMS: ICD-10-CM

## 2024-03-08 LAB
ALBUMIN SERPL ELPH-MCNC: 3.6 G/DL — SIGNIFICANT CHANGE UP (ref 3.3–5)
ALP SERPL-CCNC: 57 U/L — SIGNIFICANT CHANGE UP (ref 40–120)
ALT FLD-CCNC: 40 U/L — SIGNIFICANT CHANGE UP (ref 10–45)
ANION GAP SERPL CALC-SCNC: 11 MMOL/L — SIGNIFICANT CHANGE UP (ref 5–17)
APTT BLD: 50.5 SEC — HIGH (ref 24.5–35.6)
APTT BLD: 62 SEC — HIGH (ref 24.5–35.6)
APTT BLD: 67.5 SEC — HIGH (ref 24.5–35.6)
AST SERPL-CCNC: 85 U/L — HIGH (ref 10–40)
BILIRUB SERPL-MCNC: 1 MG/DL — SIGNIFICANT CHANGE UP (ref 0.2–1.2)
BUN SERPL-MCNC: 29 MG/DL — HIGH (ref 7–23)
CALCIUM SERPL-MCNC: 8.7 MG/DL — SIGNIFICANT CHANGE UP (ref 8.4–10.5)
CHLORIDE SERPL-SCNC: 109 MMOL/L — HIGH (ref 96–108)
CO2 SERPL-SCNC: 20 MMOL/L — LOW (ref 22–31)
CREAT SERPL-MCNC: 1.04 MG/DL — SIGNIFICANT CHANGE UP (ref 0.5–1.3)
EGFR: 70 ML/MIN/1.73M2 — SIGNIFICANT CHANGE UP
GLUCOSE SERPL-MCNC: 97 MG/DL — SIGNIFICANT CHANGE UP (ref 70–99)
HCT VFR BLD CALC: 40.1 % — SIGNIFICANT CHANGE UP (ref 39–50)
HGB BLD-MCNC: 13.5 G/DL — SIGNIFICANT CHANGE UP (ref 13–17)
INR BLD: 1.03 RATIO — SIGNIFICANT CHANGE UP (ref 0.85–1.18)
MAGNESIUM SERPL-MCNC: 2.3 MG/DL — SIGNIFICANT CHANGE UP (ref 1.6–2.6)
MCHC RBC-ENTMCNC: 31 PG — SIGNIFICANT CHANGE UP (ref 27–34)
MCHC RBC-ENTMCNC: 33.7 GM/DL — SIGNIFICANT CHANGE UP (ref 32–36)
MCV RBC AUTO: 92 FL — SIGNIFICANT CHANGE UP (ref 80–100)
NRBC # BLD: 0 /100 WBCS — SIGNIFICANT CHANGE UP (ref 0–0)
PHOSPHATE SERPL-MCNC: 2.3 MG/DL — LOW (ref 2.5–4.5)
PLATELET # BLD AUTO: 126 K/UL — LOW (ref 150–400)
POTASSIUM SERPL-MCNC: 4.3 MMOL/L — SIGNIFICANT CHANGE UP (ref 3.5–5.3)
POTASSIUM SERPL-SCNC: 4.3 MMOL/L — SIGNIFICANT CHANGE UP (ref 3.5–5.3)
PROT SERPL-MCNC: 6.3 G/DL — SIGNIFICANT CHANGE UP (ref 6–8.3)
PROTHROM AB SERPL-ACNC: 11.3 SEC — SIGNIFICANT CHANGE UP (ref 9.5–13)
RBC # BLD: 4.36 M/UL — SIGNIFICANT CHANGE UP (ref 4.2–5.8)
RBC # FLD: 13 % — SIGNIFICANT CHANGE UP (ref 10.3–14.5)
SODIUM SERPL-SCNC: 140 MMOL/L — SIGNIFICANT CHANGE UP (ref 135–145)
WBC # BLD: 6.63 K/UL — SIGNIFICANT CHANGE UP (ref 3.8–10.5)
WBC # FLD AUTO: 6.63 K/UL — SIGNIFICANT CHANGE UP (ref 3.8–10.5)

## 2024-03-08 PROCEDURE — 99233 SBSQ HOSP IP/OBS HIGH 50: CPT | Mod: GC

## 2024-03-08 PROCEDURE — 99232 SBSQ HOSP IP/OBS MODERATE 35: CPT

## 2024-03-08 RX ORDER — ASPIRIN/CALCIUM CARB/MAGNESIUM 324 MG
81 TABLET ORAL ONCE
Refills: 0 | Status: COMPLETED | OUTPATIENT
Start: 2024-03-08 | End: 2024-03-08

## 2024-03-08 RX ORDER — LOSARTAN POTASSIUM 100 MG/1
25 TABLET, FILM COATED ORAL DAILY
Refills: 0 | Status: DISCONTINUED | OUTPATIENT
Start: 2024-03-08 | End: 2024-03-09

## 2024-03-08 RX ORDER — METOPROLOL TARTRATE 50 MG
50 TABLET ORAL DAILY
Refills: 0 | Status: DISCONTINUED | OUTPATIENT
Start: 2024-03-08 | End: 2024-03-12

## 2024-03-08 RX ORDER — WARFARIN SODIUM 2.5 MG/1
5 TABLET ORAL ONCE
Refills: 0 | Status: COMPLETED | OUTPATIENT
Start: 2024-03-08 | End: 2024-03-08

## 2024-03-08 RX ADMIN — TAMSULOSIN HYDROCHLORIDE 0.4 MILLIGRAM(S): 0.4 CAPSULE ORAL at 21:21

## 2024-03-08 RX ADMIN — SENNA PLUS 2 TABLET(S): 8.6 TABLET ORAL at 21:22

## 2024-03-08 RX ADMIN — ATORVASTATIN CALCIUM 80 MILLIGRAM(S): 80 TABLET, FILM COATED ORAL at 21:21

## 2024-03-08 RX ADMIN — WARFARIN SODIUM 5 MILLIGRAM(S): 2.5 TABLET ORAL at 21:21

## 2024-03-08 RX ADMIN — LOSARTAN POTASSIUM 25 MILLIGRAM(S): 100 TABLET, FILM COATED ORAL at 13:49

## 2024-03-08 RX ADMIN — Medication 81 MILLIGRAM(S): at 13:48

## 2024-03-08 RX ADMIN — Medication 50 MILLIGRAM(S): at 13:49

## 2024-03-08 RX ADMIN — CLOPIDOGREL BISULFATE 75 MILLIGRAM(S): 75 TABLET, FILM COATED ORAL at 13:49

## 2024-03-08 RX ADMIN — POLYETHYLENE GLYCOL 3350 17 GRAM(S): 17 POWDER, FOR SOLUTION ORAL at 13:49

## 2024-03-08 RX ADMIN — HEPARIN SODIUM 650 UNIT(S)/HR: 5000 INJECTION INTRAVENOUS; SUBCUTANEOUS at 06:53

## 2024-03-08 RX ADMIN — HEPARIN SODIUM 650 UNIT(S)/HR: 5000 INJECTION INTRAVENOUS; SUBCUTANEOUS at 14:54

## 2024-03-08 RX ADMIN — HEPARIN SODIUM 650 UNIT(S)/HR: 5000 INJECTION INTRAVENOUS; SUBCUTANEOUS at 20:03

## 2024-03-08 NOTE — DIETITIAN INITIAL EVALUATION ADULT - REASON
Pt denies nutrition focused physical exam upon visit. Some sign of muscle depletion with visual assessment noted.

## 2024-03-08 NOTE — DIETITIAN INITIAL EVALUATION ADULT - OTHER INFO
-- GI: on Simethicone, Zofran, Miralax and Senna   -- s/p Warfarin 3/7, ordered for Warfarin today for LV thrombus   -- s/p K3PO4 on 3/7 for repletion  -- Most recent HbA1c 6.1 on 3/7/24, pt denies history of DM, may indicates pre-DM range.

## 2024-03-08 NOTE — DIETITIAN INITIAL EVALUATION ADULT - ENERGY INTAKE
Pt reports good PO intake with foods provided in hospital. No food preferences obtained at present as pt states he is not a picky eater and will eat whatever provided to him.  Adequate (%)

## 2024-03-08 NOTE — PROGRESS NOTE ADULT - ASSESSMENT
82yo 50kg m w pmh hld presented to o[facility] with chest pain; at osh er, found to have findings suggestive of stemi (ekg w panfilo in anterior precordial leads + elevated troponin); loaded w dapt, started on ufh infusion, and transferred to [facility] er for further mgmt; in er, c/f late presentation stemi (repeat ekg w q waves and resolving panfilo in ant precordial leads v2-v4 + elevated troponin); admit to medicine for further mgmt

## 2024-03-08 NOTE — DIETITIAN INITIAL EVALUATION ADULT - ORAL INTAKE PTA/DIET HISTORY
Pt was eating well with no changes in appetite. Pt was not following therapeutic diet; eats well-balanced meals. Confirms no known food allergies. Denies Hx of chewing or swallowing issues. Denies GI distress. Denies micronutrient or nutrient supplement use.

## 2024-03-08 NOTE — DIETITIAN INITIAL EVALUATION ADULT - REASON INDICATOR FOR ASSESSMENT
Pt seen for consult for nutrition assessment/education. Pt is Cantonese speaking, RD able to speak this language. Information obtained from pt, PCA and electronic medical record. Chart reviewed, events noted.

## 2024-03-08 NOTE — DIETITIAN INITIAL EVALUATION ADULT - PERTINENT LABORATORY DATA
03-08    140  |  109<H>  |  29<H>  ----------------------------<  97  4.3   |  20<L>  |  1.04    Ca    8.7      08 Mar 2024 06:19  Phos  2.3     03-08  Mg     2.3     03-08    TPro  6.3  /  Alb  3.6  /  TBili  1.0  /  DBili  x   /  AST  85<H>  /  ALT  40  /  AlkPhos  57  03-08  A1C with Estimated Average Glucose Result: 6.1 % (03-07-24 @ 06:15)

## 2024-03-08 NOTE — PROGRESS NOTE ADULT - SUBJECTIVE AND OBJECTIVE BOX
[facility] Division of Hospital Medicine  Blaze Hensley DO  Reachable on [employer] Teams    Patient is a 83y old  Male who presents with a chief complaint of ST elevation myocardial infarction (STEMI)     (08 Mar 2024 11:46)    SUBJECTIVE / OVERNIGHT EVENTS: No acute events overnight. Patient seen and examined at bedside this morning, feels well, denies chest pain, shortness of breath, palpitations.    REVIEW OF SYSTEMS:    CONSTITUTIONAL: No weakness, fevers or chills  EYES/ENT: No visual changes;  No vertigo or throat pain   NECK: No pain or stiffness  RESPIRATORY: No cough, wheezing, hemoptysis; No shortness of breath  CARDIOVASCULAR: No chest pain or palpitations  GASTROINTESTINAL: No abdominal or epigastric pain. No nausea, vomiting, or hematemesis; No diarrhea or constipation. No melena or hematochezia.  GENITOURINARY: No dysuria, frequency or hematuria  NEUROLOGICAL: No numbness or weakness  SKIN: No itching, burning, rashes, or lesions  MSK: No joint pain, no back pain  HEME: No easy bleeding, no easy bruising  All other review of systems is negative unless indicated above.    MEDICATIONS  (STANDING):  aspirin enteric coated 81 milliGRAM(s) Oral once  atorvastatin 80 milliGRAM(s) Oral at bedtime  clopidogrel Tablet 75 milliGRAM(s) Oral daily  heparin  Infusion.  Unit(s)/Hr (6 mL/Hr) IV Continuous <Continuous>  losartan 25 milliGRAM(s) Oral daily  metoprolol succinate ER 50 milliGRAM(s) Oral daily  polyethylene glycol 3350 17 Gram(s) Oral daily  senna 2 Tablet(s) Oral at bedtime  tamsulosin 0.4 milliGRAM(s) Oral at bedtime  warfarin 5 milliGRAM(s) Oral once    MEDICATIONS  (PRN):  acetaminophen     Tablet .. 650 milliGRAM(s) Oral every 6 hours PRN Temp greater or equal to 38C (100.4F), Mild Pain (1 - 3)  aluminum hydroxide/magnesium hydroxide/simethicone Suspension 30 milliLiter(s) Oral every 4 hours PRN Dyspepsia  heparin   Injectable 2900 Unit(s) IV Push every 6 hours PRN For aPTT less than 40  melatonin 3 milliGRAM(s) Oral at bedtime PRN Insomnia  ondansetron Injectable 4 milliGRAM(s) IV Push every 8 hours PRN Nausea and/or Vomiting      CAPILLARY BLOOD GLUCOSE        I&O's Summary    08 Mar 2024 07:01  -  08 Mar 2024 12:59  --------------------------------------------------------  IN: 240 mL / OUT: 0 mL / NET: 240 mL        PHYSICAL EXAM:  Vital Signs Last 24 Hrs  T(C): 36.9 (08 Mar 2024 12:15), Max: 36.9 (08 Mar 2024 12:15)  T(F): 98.4 (08 Mar 2024 12:15), Max: 98.4 (08 Mar 2024 12:15)  HR: 97 (08 Mar 2024 12:15) (85 - 97)  BP: 136/74 (08 Mar 2024 12:15) (111/64 - 137/66)  BP(mean): --  RR: 18 (08 Mar 2024 12:15) (18 - 18)  SpO2: 99% (08 Mar 2024 12:15) (97% - 100%)    Parameters below as of 08 Mar 2024 12:15  Patient On (Oxygen Delivery Method): room air      CONSTITUTIONAL: NAD, well-developed, well-groomed  RESPIRATORY: Normal respiratory effort; lungs are clear to auscultation bilaterally  CARDIOVASCULAR: Regular rate and rhythm, normal S1 and S2, no murmur/rub/gallop  ABDOMEN: Nontender to palpation, soft, nondistended  PSYCH: A+O to person, and time but not place (thought we were in [facility] in [place]); confused, keeps asking if he can remove his IVs  NEUROLOGY: Hard of hearing; no gross sensory deficits     LABS:                        13.5   6.63  )-----------( 126      ( 08 Mar 2024 06:19 )             40.1     03-08    140  |  109<H>  |  29<H>  ----------------------------<  97  4.3   |  20<L>  |  1.04    Ca    8.7      08 Mar 2024 06:19  Phos  2.3     03-08  Mg     2.3     03-08    TPro  6.3  /  Alb  3.6  /  TBili  1.0  /  DBili  x   /  AST  85<H>  /  ALT  40  /  AlkPhos  57  03-08    PT/INR - ( 08 Mar 2024 06:19 )   PT: 11.3 sec;   INR: 1.03 ratio         PTT - ( 08 Mar 2024 06:19 )  PTT:50.5 sec  CARDIAC MARKERS ( 07 Mar 2024 06:15 )  x     / x     / 851 U/L / x     / x          Urinalysis Basic - ( 08 Mar 2024 06:19 )    Color: x / Appearance: x / SG: x / pH: x  Gluc: 97 mg/dL / Ketone: x  / Bili: x / Urobili: x   Blood: x / Protein: x / Nitrite: x   Leuk Esterase: x / RBC: x / WBC x   Sq Epi: x / Non Sq Epi: x / Bacteria: x

## 2024-03-08 NOTE — DIETITIAN INITIAL EVALUATION ADULT - PERTINENT MEDS FT
MEDICATIONS  (STANDING):  aspirin enteric coated 81 milliGRAM(s) Oral once  atorvastatin 80 milliGRAM(s) Oral at bedtime  clopidogrel Tablet 75 milliGRAM(s) Oral daily  heparin  Infusion.  Unit(s)/Hr (6 mL/Hr) IV Continuous <Continuous>  losartan 25 milliGRAM(s) Oral daily  metoprolol succinate ER 50 milliGRAM(s) Oral daily  polyethylene glycol 3350 17 Gram(s) Oral daily  senna 2 Tablet(s) Oral at bedtime  tamsulosin 0.4 milliGRAM(s) Oral at bedtime  warfarin 5 milliGRAM(s) Oral once    MEDICATIONS  (PRN):  acetaminophen     Tablet .. 650 milliGRAM(s) Oral every 6 hours PRN Temp greater or equal to 38C (100.4F), Mild Pain (1 - 3)  aluminum hydroxide/magnesium hydroxide/simethicone Suspension 30 milliLiter(s) Oral every 4 hours PRN Dyspepsia  heparin   Injectable 2900 Unit(s) IV Push every 6 hours PRN For aPTT less than 40  melatonin 3 milliGRAM(s) Oral at bedtime PRN Insomnia  ondansetron Injectable 4 milliGRAM(s) IV Push every 8 hours PRN Nausea and/or Vomiting

## 2024-03-08 NOTE — DIETITIAN INITIAL EVALUATION ADULT - ADD RECOMMEND
-- Monitor PO intake, GI tolerance, skin integrity, labs, weight, and bowel movement regularity.   -- Follow-up with food and beverage preferences PRN.   -- Assist with meals PRN and encourage PO intake.  -- BMI <19 alert placed in chart.

## 2024-03-08 NOTE — DIETITIAN INITIAL EVALUATION ADULT - CONTINUE CURRENT NUTRITION CARE PLAN
Continue therapeutic diet Rx (DASH) as tolerated. RD remains available to adjust diet as needed./yes

## 2024-03-08 NOTE — DIETITIAN INITIAL EVALUATION ADULT - PHYSCIAL ASSESSMENT
Drug Dosing Weight (kg): 48.8 (06 Mar 2024 20:08)  Daily wt (standing or bed scale): none documented thus far   Wt obtained by RD: unable to assess as pt out of bed to recliner upon visit.     UBW: ~112lb per pt, denies history of wt loss PTA  Wt history from previous RD notes: no history   Wt history per Maximil[facility]Counts [facility]: no history      ** ? accuracy of dosing wt vs. pt stated wt. RD will continue to monitor wt trends as available/able.     IBW: 130lb, 83% IBW Ht per pt ~65"    Drug Dosing Weight (kg): 48.8 (06 Mar 2024 20:08)  Daily wt (standing or bed scale): none documented thus far   Wt obtained by RD: unable to assess as pt out of bed to recliner upon visit.     UBW: ~112lb per pt, denies history of wt loss PTA  Wt history from previous RD notes: no history   [facility]      ** ? accuracy of dosing wt vs. pt stated wt. RD will continue to monitor wt trends as available/able.     IBW: 130lb, 83% IBW

## 2024-03-08 NOTE — DIETITIAN INITIAL EVALUATION ADULT - ORAL NUTRITION SUPPLEMENTS
Recommend adding FindThatCourse Standard 1x daily (325kcal, 16g proteins) to provide additional calories and nutrients to ensure adequate PO intake in present admission.

## 2024-03-08 NOTE — DIETITIAN INITIAL EVALUATION ADULT - EDUCATION DIETARY MODIFICATIONS
pt not interested in nutrition education at present but agree for RD to leave handout for him to review later. Follow-up with nutrition education as able.

## 2024-03-08 NOTE — PROGRESS NOTE ADULT - SUBJECTIVE AND OBJECTIVE BOX
Cardiology Progress Note    03-07-24 (1d)    Subjective / Overnight Events :  - No acute events overnight.  - Pt seen and examined at bedside.     REVIEW OF SYSTEMS:  CONSTITUTIONAL: No fevers or chills  EYES/ENT: No visual changes;  No vertigo or throat pain   NECK: No pain or stiffness  RESPIRATORY: No cough, wheezing, hemoptysis; No shortness of breath  CARDIOVASCULAR: No chest pain or palpitations  GASTROINTESTINAL: No abdominal or epigastric pain. No nausea, vomiting, or hematemesis; No diarrhea or constipation. No melena or hematochezia.  GENITOURINARY: No dysuria, frequency or hematuria  NEUROLOGICAL: No syncope or dizziness  SKIN: No itching, rashes      MEDICATIONS  (STANDING):  aspirin enteric coated 81 milliGRAM(s) Oral daily  atorvastatin 80 milliGRAM(s) Oral at bedtime  clopidogrel Tablet 75 milliGRAM(s) Oral daily  heparin  Infusion.  Unit(s)/Hr (6 mL/Hr) IV Continuous <Continuous>  polyethylene glycol 3350 17 Gram(s) Oral daily  senna 2 Tablet(s) Oral at bedtime  tamsulosin 0.4 milliGRAM(s) Oral at bedtime    MEDICATIONS  (PRN):  acetaminophen     Tablet .. 650 milliGRAM(s) Oral every 6 hours PRN Temp greater or equal to 38C (100.4F), Mild Pain (1 - 3)  aluminum hydroxide/magnesium hydroxide/simethicone Suspension 30 milliLiter(s) Oral every 4 hours PRN Dyspepsia  heparin   Injectable 2900 Unit(s) IV Push every 6 hours PRN For aPTT less than 40  melatonin 3 milliGRAM(s) Oral at bedtime PRN Insomnia  ondansetron Injectable 4 milliGRAM(s) IV Push every 8 hours PRN Nausea and/or Vomiting          PHYSICAL EXAM:  Vital Signs Last 24 Hrs  T(C): 36.7 (08 Mar 2024 05:47), Max: 36.7 (07 Mar 2024 16:56)  T(F): 98.1 (08 Mar 2024 05:47), Max: 98.1 (07 Mar 2024 16:56)  HR: 90 (08 Mar 2024 05:47) (85 - 94)  BP: 130/62 (08 Mar 2024 05:47) (111/64 - 137/66)  BP(mean): --  RR: 18 (08 Mar 2024 05:47) (18 - 18)  SpO2: 97% (08 Mar 2024 05:47) (97% - 100%)    Parameters below as of 08 Mar 2024 05:47  Patient On (Oxygen Delivery Method): room air        I&O's Summary      General: NAD, non-toxic appearing   HEENT: PERRLA, EOMi, no scleral icterus  CV: RRR, normal S1 and S2, no m/r/g  Lungs: normal respiratory effort. CTAB, no wheezes, rales, or rhonchi  Abd: soft, nontender, nondistended  Ext: no edema, 2+ peripheral pulses   Pysch: AAOx3, appropriate affect   Neuro: grossly non-focal, moving all extremities spontaneously   Skin: no rashes or lesions     LABS:  CAPILLARY BLOOD GLUCOSE                                  13.5   6.63  )-----------( 126      ( 08 Mar 2024 06:19 )             40.1       WBC Trend: 6.63<--, 7.00<--, 8.02<--  Hb Trend: 13.5<--, 13.4<--, 12.8<--, 14.6<--    03-07    140  |  106  |  22  ----------------------------<  99  4.4   |  26  |  1.05    Ca    9.3      07 Mar 2024 06:15  Phos  2.1     03-07  Mg     2.3     03-07    TPro  6.5  /  Alb  3.9  /  TBili  1.3<H>  /  DBili  x   /  AST  141<H>  /  ALT  43  /  AlkPhos  58  03-07    PT/INR - ( 08 Mar 2024 06:19 )   PT: 11.3 sec;   INR: 1.03 ratio         PTT - ( 08 Mar 2024 06:19 )  PTT:50.5 sec  CARDIAC MARKERS ( 07 Mar 2024 06:15 )  x     / x     / 851 U/L / x     / x          Urinalysis Basic - ( 07 Mar 2024 06:15 )    Color: x / Appearance: x / SG: x / pH: x  Gluc: 99 mg/dL / Ketone: x  / Bili: x / Urobili: x   Blood: x / Protein: x / Nitrite: x   Leuk Esterase: x / RBC: x / WBC x   Sq Epi: x / Non Sq Epi: x / Bacteria: x            RADIOLOGY & ADDITIONAL TESTS: Reviewed Cardiology Progress Note    03-07-24 (1d)    Subjective / Overnight Events :  No events overnight. No complaints this morning, denies chest pain or palpitations.     REVIEW OF SYSTEMS:  CONSTITUTIONAL: No fevers or chills  EYES/ENT: No visual changes;  No vertigo or throat pain   NECK: No pain or stiffness  RESPIRATORY: No cough, wheezing, hemoptysis; No shortness of breath  CARDIOVASCULAR: No chest pain or palpitations  GASTROINTESTINAL: No abdominal or epigastric pain. No nausea, vomiting, or hematemesis; No diarrhea or constipation. No melena or hematochezia.  GENITOURINARY: No dysuria, frequency or hematuria  NEUROLOGICAL: No syncope or dizziness  SKIN: No itching, rashes      MEDICATIONS  (STANDING):  aspirin enteric coated 81 milliGRAM(s) Oral daily  atorvastatin 80 milliGRAM(s) Oral at bedtime  clopidogrel Tablet 75 milliGRAM(s) Oral daily  heparin  Infusion.  Unit(s)/Hr (6 mL/Hr) IV Continuous <Continuous>  polyethylene glycol 3350 17 Gram(s) Oral daily  senna 2 Tablet(s) Oral at bedtime  tamsulosin 0.4 milliGRAM(s) Oral at bedtime    MEDICATIONS  (PRN):  acetaminophen     Tablet .. 650 milliGRAM(s) Oral every 6 hours PRN Temp greater or equal to 38C (100.4F), Mild Pain (1 - 3)  aluminum hydroxide/magnesium hydroxide/simethicone Suspension 30 milliLiter(s) Oral every 4 hours PRN Dyspepsia  heparin   Injectable 2900 Unit(s) IV Push every 6 hours PRN For aPTT less than 40  melatonin 3 milliGRAM(s) Oral at bedtime PRN Insomnia  ondansetron Injectable 4 milliGRAM(s) IV Push every 8 hours PRN Nausea and/or Vomiting          PHYSICAL EXAM:  Vital Signs Last 24 Hrs  T(C): 36.7 (08 Mar 2024 05:47), Max: 36.7 (07 Mar 2024 16:56)  T(F): 98.1 (08 Mar 2024 05:47), Max: 98.1 (07 Mar 2024 16:56)  HR: 90 (08 Mar 2024 05:47) (85 - 94)  BP: 130/62 (08 Mar 2024 05:47) (111/64 - 137/66)  BP(mean): --  RR: 18 (08 Mar 2024 05:47) (18 - 18)  SpO2: 97% (08 Mar 2024 05:47) (97% - 100%)    Parameters below as of 08 Mar 2024 05:47  Patient On (Oxygen Delivery Method): room air        I&O's Summary      General: NAD, non-toxic appearing. [place]  HEENT: PERRLA, EOMi, no scleral icterus  CV: RRR, normal S1 and S2, no m/r/g  Lungs: normal respiratory effort. CTAB, no wheezes, rales, or rhonchi  Abd: soft, nontender, nondistended  Ext: no edema, 2+ peripheral pulses   Pysch: AAOx3, appropriate affect   Skin: no rashes or lesions     LABS:  CAPILLARY BLOOD GLUCOSE                                  13.5   6.63  )-----------( 126      ( 08 Mar 2024 06:19 )             40.1       WBC Trend: 6.63<--, 7.00<--, 8.02<--  Hb Trend: 13.5<--, 13.4<--, 12.8<--, 14.6<--    03-07    140  |  106  |  22  ----------------------------<  99  4.4   |  26  |  1.05    Ca    9.3      07 Mar 2024 06:15  Phos  2.1     03-07  Mg     2.3     03-07    TPro  6.5  /  Alb  3.9  /  TBili  1.3<H>  /  DBili  x   /  AST  141<H>  /  ALT  43  /  AlkPhos  58  03-07    PT/INR - ( 08 Mar 2024 06:19 )   PT: 11.3 sec;   INR: 1.03 ratio         PTT - ( 08 Mar 2024 06:19 )  PTT:50.5 sec  CARDIAC MARKERS ( 07 Mar 2024 06:15 )  x     / x     / 851 U/L / x     / x          Urinalysis Basic - ( 07 Mar 2024 06:15 )    Color: x / Appearance: x / SG: x / pH: x  Gluc: 99 mg/dL / Ketone: x  / Bili: x / Urobili: x   Blood: x / Protein: x / Nitrite: x   Leuk Esterase: x / RBC: x / WBC x   Sq Epi: x / Non Sq Epi: x / Bacteria: x            RADIOLOGY & ADDITIONAL TESTS: Reviewed

## 2024-03-08 NOTE — PROGRESS NOTE ADULT - ASSESSMENT
84 yo Cantonese speaking man with history of HLD who was transferred from [facility] for [facility] on EKG c/f late-presentation STEMI. TTE performed with evidence of wall thinning which could lead to mechanical complications with intervention. Coronary angiography deferred for medical treatment. Found to have LV thrombus.     Cardiac studies:   3/7/24 TTE - LV systolic function moderately decreased, entire apex, mid and apical anterior septum, mid and apical inferior septum, and mid and apical inferior wall are abnormal; echo-dense mass suggestive of a LV thrombus located in the apex     #late-presentation AWSTEMI   #LV thrombus   Received ACS protocol with ASA and plavix loading, heparin gtt. Would need triple therapy iso LV thrombus.   - c/w Plavix, heparin gtt   - c/w atorvastatin 80mg   - bridge with Warfarin, received 5mg last night (3/7), goal INR 2-3  - start Toprol 50mg daily     Case discussed with Dr. Preston and fellow Dr. Jose.     NOTE INCOMPLETE UNTIL ATTENDING ATTESTATION     82 yo Cantonese speaking man with history of HLD who was transferred from [facility] for [facility] on EKG c/f late-presentation STEMI. TTE performed with evidence of wall thinning which could lead to mechanical complications with intervention. Coronary angiography deferred for medical treatment. Found to have LV thrombus.     Cardiac studies:   3/7/24 TTE - LV systolic function moderately decreased, entire apex, mid and apical anterior septum, mid and apical inferior septum, and mid and apical inferior wall are abnormal; echo-dense mass suggestive of a LV thrombus located in the apex     #late-presentation AWSTEMI   #LV thrombus   Received ACS protocol with ASA and plavix loading, heparin gtt. Will need minimum 3 months of AC for LV thrombus.  - c/w Plavix, heparin gtt   - c/w atorvastatin 80mg   - bridge with Warfarin, received 5mg last night (3/7), goal INR 2-3  - start Toprol 50mg daily     Case discussed with Dr. Preston and fellow Dr. Jose.     NOTE INCOMPLETE UNTIL ATTENDING ATTESTATION     82 yo Cantonese speaking man with history of HLD who was transferred from [facility] for [facility] on EKG c/f late-presentation STEMI. TTE performed with evidence of wall thinning which could lead to mechanical complications with intervention. Coronary angiography deferred for medical treatment. Found to have LV thrombus.     Cardiac studies:   3/7/24 TTE - LV systolic function moderately decreased EF 40%, entire apex, mid and apical anterior septum, mid and apical inferior septum, and mid and apical inferior wall are abnormal; echo-dense mass suggestive of a LV thrombus located in the apex     #late-presentation AWSTEMI   #LV thrombus   #HFrEF  Received ACS protocol with ASA and plavix loading, heparin gtt. Will need minimum 3 months of AC for LV thrombus.  - c/w Plavix, heparin gtt   - c/w atorvastatin 80mg   - bridge with Warfarin, received 5mg last night (3/7), goal INR 2-3  - start Toprol 50mg daily, will need GDMT initiated prior to discharge   - plan to start Losartan 25mg 3/9    Case discussed with Dr. Preston and fellow Dr. Jose.     NOTE INCOMPLETE UNTIL ATTENDING ATTESTATION     84 yo Cantonese speaking man with history of HLD who was transferred from [facility] for [facility] on EKG c/f late-presentation STEMI. TTE performed with evidence of wall thinning which could lead to mechanical complications with intervention. Coronary angiography deferred for medical treatment. Found to have LV thrombus.     Cardiac studies:   3/7/24 TTE - LV systolic function moderately decreased EF 40%, entire apex, mid and apical anterior septum, mid and apical inferior septum, and mid and apical inferior wall are abnormal; echo-dense mass suggestive of a LV thrombus located in the apex     #late-presentation AWSTEMI   #LV thrombus   #HFrEF  Received ACS protocol with ASA and plavix loading, heparin gtt. Will need minimum 3 months of AC for LV thrombus.  - c/w Plavix, heparin gtt   - c/w atorvastatin 80mg   - bridge with Warfarin, received 5mg last night (3/7), goal INR 2-3  - start Toprol 50mg daily  - start Losartan 25mg daily   - will need GDMT prior to discharge     Case discussed with Dr. Preston and fellow Dr. Jose.

## 2024-03-09 LAB
ANION GAP SERPL CALC-SCNC: 10 MMOL/L — SIGNIFICANT CHANGE UP (ref 5–17)
APTT BLD: 68.4 SEC — HIGH (ref 24.5–35.6)
BUN SERPL-MCNC: 30 MG/DL — HIGH (ref 7–23)
CALCIUM SERPL-MCNC: 8.8 MG/DL — SIGNIFICANT CHANGE UP (ref 8.4–10.5)
CHLORIDE SERPL-SCNC: 109 MMOL/L — HIGH (ref 96–108)
CO2 SERPL-SCNC: 22 MMOL/L — SIGNIFICANT CHANGE UP (ref 22–31)
CREAT SERPL-MCNC: 1.23 MG/DL — SIGNIFICANT CHANGE UP (ref 0.5–1.3)
EGFR: 58 ML/MIN/1.73M2 — LOW
GLUCOSE SERPL-MCNC: 90 MG/DL — SIGNIFICANT CHANGE UP (ref 70–99)
HCT VFR BLD CALC: 34.4 % — LOW (ref 39–50)
HGB BLD-MCNC: 11.6 G/DL — LOW (ref 13–17)
INR BLD: 1.8 RATIO — HIGH (ref 0.85–1.18)
MAGNESIUM SERPL-MCNC: 2.2 MG/DL — SIGNIFICANT CHANGE UP (ref 1.6–2.6)
MCHC RBC-ENTMCNC: 31.2 PG — SIGNIFICANT CHANGE UP (ref 27–34)
MCHC RBC-ENTMCNC: 33.7 GM/DL — SIGNIFICANT CHANGE UP (ref 32–36)
MCV RBC AUTO: 92.5 FL — SIGNIFICANT CHANGE UP (ref 80–100)
NRBC # BLD: 0 /100 WBCS — SIGNIFICANT CHANGE UP (ref 0–0)
PHOSPHATE SERPL-MCNC: 2.5 MG/DL — SIGNIFICANT CHANGE UP (ref 2.5–4.5)
PLATELET # BLD AUTO: 134 K/UL — LOW (ref 150–400)
POTASSIUM SERPL-MCNC: 4.1 MMOL/L — SIGNIFICANT CHANGE UP (ref 3.5–5.3)
POTASSIUM SERPL-SCNC: 4.1 MMOL/L — SIGNIFICANT CHANGE UP (ref 3.5–5.3)
PROTHROM AB SERPL-ACNC: 18.6 SEC — HIGH (ref 9.5–13)
RBC # BLD: 3.72 M/UL — LOW (ref 4.2–5.8)
RBC # FLD: 13.3 % — SIGNIFICANT CHANGE UP (ref 10.3–14.5)
SODIUM SERPL-SCNC: 141 MMOL/L — SIGNIFICANT CHANGE UP (ref 135–145)
WBC # BLD: 6.6 K/UL — SIGNIFICANT CHANGE UP (ref 3.8–10.5)
WBC # FLD AUTO: 6.6 K/UL — SIGNIFICANT CHANGE UP (ref 3.8–10.5)

## 2024-03-09 PROCEDURE — 99232 SBSQ HOSP IP/OBS MODERATE 35: CPT | Mod: GC

## 2024-03-09 PROCEDURE — 99232 SBSQ HOSP IP/OBS MODERATE 35: CPT

## 2024-03-09 RX ORDER — LOSARTAN POTASSIUM 100 MG/1
50 TABLET, FILM COATED ORAL DAILY
Refills: 0 | Status: DISCONTINUED | OUTPATIENT
Start: 2024-03-10 | End: 2024-03-10

## 2024-03-09 RX ORDER — WARFARIN SODIUM 2.5 MG/1
2.5 TABLET ORAL ONCE
Refills: 0 | Status: COMPLETED | OUTPATIENT
Start: 2024-03-09 | End: 2024-03-09

## 2024-03-09 RX ORDER — TAMSULOSIN HYDROCHLORIDE 0.4 MG/1
1 CAPSULE ORAL
Qty: 0 | Refills: 0 | DISCHARGE
Start: 2024-03-09

## 2024-03-09 RX ADMIN — TAMSULOSIN HYDROCHLORIDE 0.4 MILLIGRAM(S): 0.4 CAPSULE ORAL at 21:43

## 2024-03-09 RX ADMIN — ATORVASTATIN CALCIUM 80 MILLIGRAM(S): 80 TABLET, FILM COATED ORAL at 21:44

## 2024-03-09 RX ADMIN — Medication 50 MILLIGRAM(S): at 05:27

## 2024-03-09 RX ADMIN — LOSARTAN POTASSIUM 25 MILLIGRAM(S): 100 TABLET, FILM COATED ORAL at 05:27

## 2024-03-09 RX ADMIN — HEPARIN SODIUM 650 UNIT(S)/HR: 5000 INJECTION INTRAVENOUS; SUBCUTANEOUS at 09:33

## 2024-03-09 RX ADMIN — WARFARIN SODIUM 2.5 MILLIGRAM(S): 2.5 TABLET ORAL at 21:44

## 2024-03-09 RX ADMIN — POLYETHYLENE GLYCOL 3350 17 GRAM(S): 17 POWDER, FOR SOLUTION ORAL at 13:35

## 2024-03-09 RX ADMIN — CLOPIDOGREL BISULFATE 75 MILLIGRAM(S): 75 TABLET, FILM COATED ORAL at 13:35

## 2024-03-09 RX ADMIN — SENNA PLUS 2 TABLET(S): 8.6 TABLET ORAL at 21:44

## 2024-03-09 RX ADMIN — HEPARIN SODIUM 650 UNIT(S)/HR: 5000 INJECTION INTRAVENOUS; SUBCUTANEOUS at 05:27

## 2024-03-09 NOTE — DISCHARGE NOTE PROVIDER - CARE PROVIDER_API CALL
Anca, On  Phone: [phone]  Fax: (   )    -  Established Patient  Follow Up Time: 1 week    Kanu Barron  Cardiovascular Disease  [address] [place] [id number]  Phone: [phone]  Fax: [phone]  Follow Up Time: 1 week

## 2024-03-09 NOTE — DISCHARGE NOTE PROVIDER - NSDCFUADDAPPT_GEN_ALL_CORE_FT
APPTS ARE READY TO BE MADE: [ X] YES    Best Family or Patient Contact (if needed):    Additional Information about above appointments (if needed):    1:   2:   3:     Other comments or requests:    APPTS ARE READY TO BE MADE: [ X] YES    Best Family or Patient Contact (if needed):    Additional Information about above appointments (if needed):    1:   2:   3:     Other comments or requests:   Prior appt with Dr. March on 3/16.

## 2024-03-09 NOTE — DISCHARGE NOTE PROVIDER - NSDCCPCAREPLAN_GEN_ALL_CORE_FT
PRINCIPAL DISCHARGE DIAGNOSIS  Diagnosis: ST elevation MI (STEMI)  Assessment and Plan of Treatment: You came to the hospital after suffering a heart attack. It was decided that it was going to be managed with medication. Please continue to take and follow up with      SECONDARY DISCHARGE DIAGNOSES  Diagnosis: Left ventricular thrombus  Assessment and Plan of Treatment: You were found to have a bloot clot in the left ventricle of your heart. Please continue to take Coumadin and follow up with --to check your INR.     PRINCIPAL DISCHARGE DIAGNOSIS  Diagnosis: ST elevation MI (STEMI)  Assessment and Plan of Treatment: You came to the hospital after suffering a heart attack. It was decided that it was going to be managed with medication. Please continue to take and follow up with your PCP.      SECONDARY DISCHARGE DIAGNOSES  Diagnosis: Left ventricular thrombus  Assessment and Plan of Treatment: You were found to have a bloot clot in the left ventricle of your heart. Please continue to take Coumadin and follow up with --to check your INR.     PRINCIPAL DISCHARGE DIAGNOSIS  Diagnosis: ST elevation MI (STEMI)  Assessment and Plan of Treatment: You came to the hospital after suffering a heart attack. It was decided that it was going to be managed with medication. Please continue to take and follow up with your PCP.      SECONDARY DISCHARGE DIAGNOSES  Diagnosis: OLMAN (acute kidney injury)  Assessment and Plan of Treatment: Avoid taking (NSAIDs) - (ex: Ibuprofen, Advil, Celebrex, Naprosyn)  Avoid taking any nephrotoxic agents (can harm kidneys) - Intravenous contrast for diagnostic testing, combination cold medications.  Have all medications adjusted for your renal function by your Health Care Provider.  Blood pressure control is important.  Take all medication as prescribed.      Diagnosis: Left ventricular thrombus  Assessment and Plan of Treatment: You were found to have a bloot clot in the left ventricle of your heart. Please continue to take Coumadin and follow up with --to check your INR.

## 2024-03-09 NOTE — PROGRESS NOTE ADULT - ASSESSMENT
84yo 50kg m w pmh hld presented to osh with chest pain; at osh er, found to have findings suggestive of stemi (ekg w panfilo in anterior precordial leads + elevated troponin); loaded w dapt, started on ufh infusion, and transferred to [facility] er for further mgmt; in er, c/f late presentation stemi (repeat ekg w q waves and resolving panfilo in ant precordial leads v2-v4 + elevated troponin); admit to medicine for further mgmt

## 2024-03-09 NOTE — DISCHARGE NOTE PROVIDER - HOSPITAL COURSE
HPI:  82yo 50kg m w pmh hld presented to osh with chest pain; reportedly, started a couple of days ago, substernal, radiating to back, characterized as squeezing, occurring even at rest. family grew concerned so had patient go to osh er; at osh er, found to have findings suggestive of stemi (ekg w panfilo in anterior precordial leads + elevated troponin); patient was loaded w dapt, started on ufh infusion, and transferred to [facility] er for further mgmt; in er, c/f late presentation stemi (repeat ekg w q waves and resolving panfilo in ant precordial leads v2-v4 + elevated troponin); admit to medicine for further mgmt (07 Mar 2024 03:59)    Hospital Course:      Important Medication Changes and Reason:    Active or Pending Issues Requiring Follow-up:    Advanced Directives:   [ ] Full code  [ ] DNR  [ ] Hospice    Discharge Diagnoses:  STEMI  LV thrombus  HLD  BPH         HPI:  84yo 50kg m w pmh hld presented to osh with chest pain; reportedly, started a couple of days ago, substernal, radiating to back, characterized as squeezing, occurring even at rest. family grew concerned so had patient go to osh er; at osh er, found to have findings suggestive of stemi (ekg w panfilo in anterior precordial leads + elevated troponin); patient was loaded w dapt, started on ufh infusion, and transferred to [facility] er for further mgmt; in er, c/f late presentation stemi (repeat ekg w q waves and resolving panfilo in ant precordial leads v2-v4 + elevated troponin); admit to medicine for further mgmt (07 Mar 2024 03:59)    Hospital Course:  Admitted for ACS 2/2 STEMI. Patient presenting from OSH with late presenting MI. Cards consulted, no cardiac cath due to resolution of symptoms, patient's age, and risk of complications. Continued plavix, no aspirin (to avoid triple therapy), high intensity statin. Continued GDMT with Metoprolol and Losartan however had OLMAN, held losartan. LV thrombus, s/p hep gtt, resumed on coumadin. OLMAN, s/p IVF bolus 3/11, renal US neg for hydro, Cr downtrending. Medically cleared to be dc'ed as per attending Dr. Wheeler.     Important Medication Changes and Reason:    Active or Pending Issues Requiring Follow-up:    Advanced Directives:   [ ] Full code  [ ] DNR  [ ] Hospice    Discharge Diagnoses:  STEMI  LV thrombus  HLD  BPH         HPI:  82yo 50kg m w pmh hld presented to osh with chest pain; reportedly, started a couple of days ago, substernal, radiating to back, characterized as squeezing, occurring even at rest. family grew concerned so had patient go to osh er; at osh er, found to have findings suggestive of stemi (ekg w panfilo in anterior precordial leads + elevated troponin); patient was loaded w dapt, started on ufh infusion, and transferred to [facility] er for further mgmt; in er, c/f late presentation stemi (repeat ekg w q waves and resolving panfilo in ant precordial leads v2-v4 + elevated troponin); admit to medicine for further mgmt (07 Mar 2024 03:59)    Hospital Course:  Admitted for ACS 2/2 STEMI. Patient presenting from OSH with late presenting MI. Cards consulted, no cardiac cath due to resolution of symptoms, patient's age, and risk of complications. Continued plavix, no aspirin (to avoid triple therapy), high intensity statin. Continued GDMT with Metoprolol and Losartan however had OLMAN, held losartan. LV thrombus, s/p hep gtt, resumed on coumadin. OLMAN, s/p IVF bolus 3/11, renal US neg for hydro, Cr downtrending. Medically cleared to be dc'ed as per attending Dr. Wheeler.     Important Medication Changes and Reason:    Active or Pending Issues Requiring Follow-up:  F/u PCP, cards   Advanced Directives:   [ ] Full code  [ ] DNR  [ ] Hospice    Discharge Diagnoses:  STEMI  LV thrombus  HLD  BPH         HPI:  82yo 50kg m w pmh hld presented to osh with chest pain; reportedly, started a couple of days ago, substernal, radiating to back, characterized as squeezing, occurring even at rest. family grew concerned so had patient go to osh er; at osh er, found to have findings suggestive of stemi (ekg w panfilo in anterior precordial leads + elevated troponin); patient was loaded w dapt, started on ufh infusion, and transferred to [facility] er for further mgmt; in er, c/f late presentation stemi (repeat ekg w q waves and resolving panfilo in ant precordial leads v2-v4 + elevated troponin); admit to medicine for further mgmt (07 Mar 2024 03:59)    Hospital Course:  Admitted for ACS 2/2 STEMI. Patient presenting from OSH with late presenting MI. Cards consulted, no cardiac cath due to resolution of symptoms, patient's age, and risk of complications. Continued plavix, no aspirin (to avoid triple therapy), high intensity statin. Continued GDMT with Metoprolol and Losartan however had OLMAN, held losartan. LV thrombus, s/p hep gtt, resumed on coumadin. OLMAN, s/p IVF bolus 3/11, renal US neg for hydro, Cr downtrending. Medically cleared to be dc'ed as per attending Dr. Wheeler.     Important Medication Changes and Reason:    Active or Pending Issues Requiring Follow-up:  F/u PCP, cards   Advanced Directives:   [ ] Full code  [ ] DNR  [ ] Hospice    Discharge Diagnoses:  STEMI  LV thrombus  CKD         HPI:  84yo 50kg m w pmh hld presented to osh with chest pain; reportedly, started a couple of days ago, substernal, radiating to back, characterized as squeezing, occurring even at rest. family grew concerned so had patient go to osh er; at osh er, found to have findings suggestive of stemi (ekg w panfilo in anterior precordial leads + elevated troponin); patient was loaded w dapt, started on ufh infusion, and transferred to [facility] er for further mgmt; in er, c/f late presentation stemi (repeat ekg w q waves and resolving panfilo in ant precordial leads v2-v4 + elevated troponin); admit to medicine for further mgmt (07 Mar 2024 03:59)    Hospital Course:  Admitted for ACS 2/2 STEMI. Patient presenting from OSH with late presenting MI. Cards consulted, no cardiac cath due to resolution of symptoms, patient's age, and risk of complications. Continued plavix, no aspirin (to avoid triple therapy), high intensity statin. Continued GDMT with Metoprolol and Losartan however had OLMAN, held losartan. LV thrombus, s/p hep gtt, resumed on coumadin. OLMAN, s/p IVF bolus 3/11, renal US neg for hydro, Cr downtrending. Medically cleared to be dc'ed as per attending Dr. Wheeler.     Important Medication Changes and Reason:  atorvastatin 80 mg q hs  plavix 75 mg daily   metoprolol succinate 50 mg daily   Active or Pending Issues Requiring Follow-up:  F/u PCP, cards   Advanced Directives:   [ ] Full code  [ ] DNR  [ ] Hospice    Discharge Diagnoses:  STEMI  LV thrombus  CKD

## 2024-03-09 NOTE — DISCHARGE NOTE PROVIDER - DETAILS OF MALNUTRITION DIAGNOSIS/DIAGNOSES
This patient has been assessed with a concern for Malnutrition and was treated during this hospitalization for the following Nutrition diagnosis/diagnoses:     -  03/08/2024: Underweight (BMI < 19)

## 2024-03-09 NOTE — PROGRESS NOTE ADULT - SUBJECTIVE AND OBJECTIVE BOX
Refill request for Lorazepam. Only needed for MRI. Not having MRI at this time. Refill denied.    [facility] Division of Hospital Medicine  Blaze Hensley DO  Reach[employer] on [email or web address]    Patient is a 83y old  Male who presents with a chief complaint of chest pain (09 Mar 2024 08:30)    SUBJECTIVE / OVERNIGHT EVENTS: No acute events overnight. Patient seen and examined at bedside this morning, feels well, denies chest pain, shortness of breath, palpitations.    REVIEW OF SYSTEMS:    CONSTITUTIONAL: No weakness, fevers or chills  EYES/ENT: No visual changes;  No vertigo or throat pain   NECK: No pain or stiffness  RESPIRATORY: No cough, wheezing, hemoptysis; No shortness of breath  CARDIOVASCULAR: No chest pain or palpitations  GASTROINTESTINAL: No abdominal or epigastric pain. No nausea, vomiting, or hematemesis; No diarrhea or constipation. No melena or hematochezia.  GENITOURINARY: No dysuria, frequency or hematuria  NEUROLOGICAL: No numbness or weakness  SKIN: No itching, burning, rashes, or lesions  MSK: No joint pain, no back pain  HEME: No easy bleeding, no easy bruising  All other review of systems is negative unless indicated above.    MEDICATIONS  (STANDING):  atorvastatin 80 milliGRAM(s) Oral at bedtime  clopidogrel Tablet 75 milliGRAM(s) Oral daily  heparin  Infusion.  Unit(s)/Hr (6 mL/Hr) IV Continuous <Continuous>  metoprolol succinate ER 50 milliGRAM(s) Oral daily  polyethylene glycol 3350 17 Gram(s) Oral daily  senna 2 Tablet(s) Oral at bedtime  tamsulosin 0.4 milliGRAM(s) Oral at bedtime  warfarin 2.5 milliGRAM(s) Oral once    MEDICATIONS  (PRN):  acetaminophen     Tablet .. 650 milliGRAM(s) Oral every 6 hours PRN Temp greater or equal to 38C (100.4F), Mild Pain (1 - 3)  aluminum hydroxide/magnesium hydroxide/simethicone Suspension 30 milliLiter(s) Oral every 4 hours PRN Dyspepsia  heparin   Injectable 2900 Unit(s) IV Push every 6 hours PRN For aPTT less than 40  melatonin 3 milliGRAM(s) Oral at bedtime PRN Insomnia  ondansetron Injectable 4 milliGRAM(s) IV Push every 8 hours PRN Nausea and/or Vomiting      CAPILLARY BLOOD GLUCOSE        I&O's Summary    08 Mar 2024 07:01  -  09 Mar 2024 07:00  --------------------------------------------------------  IN: 580 mL / OUT: 0 mL / NET: 580 mL        PHYSICAL EXAM:  Vital Signs Last 24 Hrs  T(C): 36.8 (09 Mar 2024 12:56), Max: 36.9 (08 Mar 2024 21:19)  T(F): 98.2 (09 Mar 2024 12:56), Max: 98.4 (08 Mar 2024 21:19)  HR: 60 (09 Mar 2024 12:56) (60 - 81)  BP: 98/58 (09 Mar 2024 12:56) (98/58 - 127/69)  BP(mean): --  RR: 18 (09 Mar 2024 12:56) (18 - 18)  SpO2: 99% (09 Mar 2024 12:56) (97% - 99%)    Parameters below as of 09 Mar 2024 12:56  Patient On (Oxygen Delivery Method): room air    CONSTITUTIONAL: NAD, well-developed, well-groomed  RESPIRATORY: Normal respiratory effort; lungs are clear to auscultation bilaterally  CARDIOVASCULAR: Regular rate and rhythm, normal S1 and S2, no murmur/rub/gallop  ABDOMEN: Nontender to palpation, soft, nondistended  PSYCH: A+O to person, and time but not place (thought we at his home); confused  NEUROLOGY: Hard of hearing; no gross sensory deficits    LABS:                        11.6   6.60  )-----------( 134      ( 09 Mar 2024 07:31 )             34.4     03-09    141  |  109<H>  |  30<H>  ----------------------------<  90  4.1   |  22  |  1.23    Ca    8.8      09 Mar 2024 07:35  Phos  2.5     03-09  Mg     2.2     03-09    TPro  6.3  /  Alb  3.6  /  TBili  1.0  /  DBili  x   /  AST  85<H>  /  ALT  40  /  AlkPhos  57  03-08    PT/INR - ( 09 Mar 2024 07:31 )   PT: 18.6 sec;   INR: 1.80 ratio         PTT - ( 09 Mar 2024 07:31 )  PTT:68.4 sec      Urinalysis Basic - ( 09 Mar 2024 07:35 )    Color: x / Appearance: x / SG: x / pH: x  Gluc: 90 mg/dL / Ketone: x  / Bili: x / Urobili: x   Blood: x / Protein: x / Nitrite: x   Leuk Esterase: x / RBC: x / WBC x   Sq Epi: x / Non Sq Epi: x / Bacteria: x

## 2024-03-09 NOTE — PROGRESS NOTE ADULT - SUBJECTIVE AND OBJECTIVE BOX
Patient seen and examined at bedside.    Overnight Events:   - No acute events overnight  - Denies CP/SOB, palpitations LH dizziness  - On tele NSR 60-70s          Current Meds:  acetaminophen     Tablet .. 650 milliGRAM(s) Oral every 6 hours PRN  aluminum hydroxide/magnesium hydroxide/simethicone Suspension 30 milliLiter(s) Oral every 4 hours PRN  atorvastatin 80 milliGRAM(s) Oral at bedtime  clopidogrel Tablet 75 milliGRAM(s) Oral daily  heparin   Injectable 2900 Unit(s) IV Push every 6 hours PRN  heparin  Infusion.  Unit(s)/Hr IV Continuous <Continuous>  losartan 25 milliGRAM(s) Oral daily  melatonin 3 milliGRAM(s) Oral at bedtime PRN  metoprolol succinate ER 50 milliGRAM(s) Oral daily  ondansetron Injectable 4 milliGRAM(s) IV Push every 8 hours PRN  polyethylene glycol 3350 17 Gram(s) Oral daily  senna 2 Tablet(s) Oral at bedtime  tamsulosin 0.4 milliGRAM(s) Oral at bedtime      Vitals:  T(F): 97.9 (03-09), Max: 98.4 (03-08)  HR: 81 (03-09) (75 - 99)  BP: 127/69 (03-09) (100/62 - 136/74)  RR: 18 (03-09)  SpO2: 97% (03-09)  I&O's Summary    08 Mar 2024 07:01  -  09 Mar 2024 07:00  --------------------------------------------------------  IN: 580 mL / OUT: 0 mL / NET: 580 mL        Physical Exam:  General: NAD, non-toxic appearing. [place]  HEENT: PERRLA, EOMi, no scleral icterus  CV: RRR, normal S1 and S2, no m/r/g  Lungs: normal respiratory effort. CTAB, no wheezes, rales, or rhonchi  Abd: soft, nontender, nondistended  Ext: no edema, 2+ peripheral pulses   Pysch: AAOx3, appropriate affect   Skin: no rashes or lesions                           11.6   6.60  )-----------( 134      ( 09 Mar 2024 07:31 )             34.4     03-09    141  |  109<H>  |  30<H>  ----------------------------<  90  4.1   |  22  |  1.23    Ca    8.8      09 Mar 2024 07:35  Phos  2.5     03-09  Mg     2.2     03-09    TPro  6.3  /  Alb  3.6  /  TBili  1.0  /  DBili  x   /  AST  85<H>  /  ALT  40  /  AlkPhos  57  03-08    PT/INR - ( 09 Mar 2024 07:31 )   PT: 18.6 sec;   INR: 1.80 ratio         PTT - ( 09 Mar 2024 07:31 )  PTT:68.4 sec  CARDIAC MARKERS ( 07 Mar 2024 06:15 )  4083 ng/L / x     / x     / 851 U/L / x     / x      CARDIAC MARKERS ( 06 Mar 2024 20:12 )  5156 ng/L / x     / x     / x     / x     / x                      Echo:    TTE 3/7/24  CONCLUSIONS:      1. Left ventricular cavity is normal in size. Left ventricular wall thickness is normal. Left ventricular systolic function is moderately decreased. Regional wall motion abnormalities present.   2. Entire apex, mid and apical anterior septum, mid and apical inferior septum, and mid and apical inferior wall are abnormal.   3. There is an echo-dense mass, suggestive of a left ventricular thrombus located in the apex.

## 2024-03-09 NOTE — DISCHARGE NOTE PROVIDER - NSDCMRMEDTOKEN_GEN_ALL_CORE_FT
atorvastatin 40 mg oral tablet: 1 tab(s) orally once a day (at bedtime)  MiraLax oral powder for reconstitution: 17 gram(s) orally once a day  senna (sennosides) 8.6 mg oral tablet: 2 tab(s) orally once a day (at bedtime)  tamsulosin 0.4 mg oral capsule: 1 cap(s) orally once a day (at bedtime)   atorvastatin 80 mg oral tablet: 1 tab(s) orally once a day (at bedtime)  clopidogrel 75 mg oral tablet: 1 tab(s) orally once a day  metoprolol succinate 50 mg oral tablet, extended release: 1 tab(s) orally once a day  MiraLax oral powder for reconstitution: 17 gram(s) orally once a day  senna (sennosides) 8.6 mg oral tablet: 2 tab(s) orally once a day (at bedtime)  tamsulosin 0.4 mg oral capsule: 1 cap(s) orally once a day (at bedtime)

## 2024-03-09 NOTE — DISCHARGE NOTE PROVIDER - PROVIDER TOKENS
FREE:[LAST:[March],FIRST:[On],PHONE:[phone]],FAX:[(   )    -],FOLLOWUP:[1 week],ESTABLISHEDPATIENT:[T]],PROVIDER:[TOKEN:[[id number]],FOLLOWUP:[1 week]]

## 2024-03-09 NOTE — PROGRESS NOTE ADULT - ASSESSMENT
82 yo Cantonese speaking man with history of HLD who was transferred from [facility] for [facility] on EKG c/f late-presentation STEMI. TTE performed with evidence of wall thinning which could lead to mechanical complications with intervention. Coronary angiography deferred for medical treatment. Found to have LV thrombus.     Cardiac studies:   3/7/24 TTE - LV systolic function moderately decreased EF 40%, entire apex, mid and apical anterior septum, mid and apical inferior septum, and mid and apical inferior wall are abnormal; echo-dense mass suggestive of a LV thrombus located in the apex     #late-presentation AWSTEMI   #LV thrombus   #HFrEF  Received ACS protocol with ASA and plavix loading, heparin gtt. Will need minimum 3 months of AC for LV thrombus.  - c/w Plavix, heparin gtt   - c/w atorvastatin 80mg   - bridge with Warfarin, received 5mg last night (3/8), goal INR 2-3  - Continue Toprol 50mg daily  - Increase Losartan to 50mg daily    Note not final until signed by attending       Miri Will MD  Cardiology Fellow PGY-6      For all New Consults  [email or web address]   Login: alex

## 2024-03-10 DIAGNOSIS — N17.9 ACUTE KIDNEY FAILURE, UNSPECIFIED: ICD-10-CM

## 2024-03-10 LAB
ANION GAP SERPL CALC-SCNC: 9 MMOL/L — SIGNIFICANT CHANGE UP (ref 5–17)
APTT BLD: 102.1 SEC — HIGH (ref 24.5–35.6)
BUN SERPL-MCNC: 35 MG/DL — HIGH (ref 7–23)
CALCIUM SERPL-MCNC: 9.2 MG/DL — SIGNIFICANT CHANGE UP (ref 8.4–10.5)
CHLORIDE SERPL-SCNC: 113 MMOL/L — HIGH (ref 96–108)
CO2 SERPL-SCNC: 22 MMOL/L — SIGNIFICANT CHANGE UP (ref 22–31)
CREAT SERPL-MCNC: 1.42 MG/DL — HIGH (ref 0.5–1.3)
EGFR: 48 ML/MIN/1.73M2 — LOW
GLUCOSE SERPL-MCNC: 107 MG/DL — HIGH (ref 70–99)
HCT VFR BLD CALC: 34.8 % — LOW (ref 39–50)
HGB BLD-MCNC: 11.1 G/DL — LOW (ref 13–17)
INR BLD: 3.44 RATIO — HIGH (ref 0.85–1.18)
MAGNESIUM SERPL-MCNC: 2.4 MG/DL — SIGNIFICANT CHANGE UP (ref 1.6–2.6)
MCHC RBC-ENTMCNC: 30.4 PG — SIGNIFICANT CHANGE UP (ref 27–34)
MCHC RBC-ENTMCNC: 31.9 GM/DL — LOW (ref 32–36)
MCV RBC AUTO: 95.3 FL — SIGNIFICANT CHANGE UP (ref 80–100)
NRBC # BLD: 0 /100 WBCS — SIGNIFICANT CHANGE UP (ref 0–0)
PHOSPHATE SERPL-MCNC: 3.5 MG/DL — SIGNIFICANT CHANGE UP (ref 2.5–4.5)
PLATELET # BLD AUTO: 135 K/UL — LOW (ref 150–400)
POTASSIUM SERPL-MCNC: 3.8 MMOL/L — SIGNIFICANT CHANGE UP (ref 3.5–5.3)
POTASSIUM SERPL-SCNC: 3.8 MMOL/L — SIGNIFICANT CHANGE UP (ref 3.5–5.3)
PROTHROM AB SERPL-ACNC: 34.8 SEC — HIGH (ref 9.5–13)
RBC # BLD: 3.65 M/UL — LOW (ref 4.2–5.8)
RBC # FLD: 13.4 % — SIGNIFICANT CHANGE UP (ref 10.3–14.5)
SODIUM SERPL-SCNC: 144 MMOL/L — SIGNIFICANT CHANGE UP (ref 135–145)
WBC # BLD: 5.94 K/UL — SIGNIFICANT CHANGE UP (ref 3.8–10.5)
WBC # FLD AUTO: 5.94 K/UL — SIGNIFICANT CHANGE UP (ref 3.8–10.5)

## 2024-03-10 PROCEDURE — 99232 SBSQ HOSP IP/OBS MODERATE 35: CPT

## 2024-03-10 PROCEDURE — 99232 SBSQ HOSP IP/OBS MODERATE 35: CPT | Mod: GC

## 2024-03-10 RX ORDER — SODIUM CHLORIDE 9 MG/ML
500 INJECTION, SOLUTION INTRAVENOUS
Refills: 0 | Status: DISCONTINUED | OUTPATIENT
Start: 2024-03-10 | End: 2024-03-11

## 2024-03-10 RX ADMIN — SODIUM CHLORIDE 50 MILLILITER(S): 9 INJECTION, SOLUTION INTRAVENOUS at 10:20

## 2024-03-10 RX ADMIN — HEPARIN SODIUM 0 UNIT(S)/HR: 5000 INJECTION INTRAVENOUS; SUBCUTANEOUS at 08:32

## 2024-03-10 RX ADMIN — CLOPIDOGREL BISULFATE 75 MILLIGRAM(S): 75 TABLET, FILM COATED ORAL at 12:14

## 2024-03-10 RX ADMIN — HEPARIN SODIUM 650 UNIT(S)/HR: 5000 INJECTION INTRAVENOUS; SUBCUTANEOUS at 06:43

## 2024-03-10 RX ADMIN — SENNA PLUS 2 TABLET(S): 8.6 TABLET ORAL at 23:32

## 2024-03-10 RX ADMIN — POLYETHYLENE GLYCOL 3350 17 GRAM(S): 17 POWDER, FOR SOLUTION ORAL at 12:15

## 2024-03-10 RX ADMIN — ATORVASTATIN CALCIUM 80 MILLIGRAM(S): 80 TABLET, FILM COATED ORAL at 21:45

## 2024-03-10 RX ADMIN — TAMSULOSIN HYDROCHLORIDE 0.4 MILLIGRAM(S): 0.4 CAPSULE ORAL at 21:45

## 2024-03-10 RX ADMIN — LOSARTAN POTASSIUM 50 MILLIGRAM(S): 100 TABLET, FILM COATED ORAL at 05:46

## 2024-03-10 RX ADMIN — Medication 50 MILLIGRAM(S): at 05:46

## 2024-03-10 NOTE — PROGRESS NOTE ADULT - ASSESSMENT
82yo 50kg m w pmh hld presented to [facility] with chest pain; at [facility] er, found to have findings suggestive of stemi (ekg w panfilo in anterior precordial leads + elevated troponin); loaded w dapt, started on ufh infusion, and transferred to [facility] er for further mgmt; in er, c/f late presentation stemi (repeat ekg w q waves and resolving panfilo in ant precordial leads v2-v4 + elevated troponin); admit to medicine for further mgmt

## 2024-03-10 NOTE — PROGRESS NOTE ADULT - ASSESSMENT
82 yo Cantonese speaking man with history of HLD who was transferred from [facility] for NICKY on EKG c/f late-presentation STEMI. TTE performed with evidence of wall thinning which could lead to mechanical complications with intervention. Coronary angiography deferred for medical treatment. Found to have LV thrombus.     Cardiac studies:   3/7/24 TTE - LV systolic function moderately decreased EF 40%, entire apex, mid and apical anterior septum, mid and apical inferior septum, and mid and apical inferior wall are abnormal; echo-dense mass suggestive of a LV thrombus located in the apex     #late-presentation AWSTEMI   #LV thrombus   #HFrEF  Received ACS protocol with ASA and plavix loading, heparin gtt. Will need minimum 3 months of AC for LV thrombus.  - c/w Plavix, heparin gtt   - c/w atorvastatin 80mg   - bridge with Warfarin goal INR 2-3  - Continue Toprol 50mg daily  - Increased Losartan to 50mg daily  - Farxiga on discharge     Note not final until signed by attending      82 yo Cantonese speaking man with history of HLD who was transferred from [facility] for NICKY on EKG c/f late-presentation STEMI. TTE performed with evidence of wall thinning which could lead to mechanical complications with intervention. Coronary angiography deferred for medical treatment. Found to have LV thrombus.     Cardiac studies:   3/7/24 TTE - LV systolic function moderately decreased EF 40%, entire apex, mid and apical anterior septum, mid and apical inferior septum, and mid and apical inferior wall are abnormal; echo-dense mass suggestive of a LV thrombus located in the apex     #late-presentation AWSTEMI   #LV thrombus   #HFrEF  Received ACS protocol with ASA and plavix loading, heparin gtt. Will need minimum 3 months of AC for LV thrombus.  - c/w Plavix. Bridge to COumadin with INR 3.44 today. Dc heparin gt  - c/w atorvastatin 80mg   - Continue Toprol 50mg daily  - Increased Losartan to 50mg daily  - Farxiga on discharge     Note not final until signed by attending      84 yo Cantonese speaking man with history of HLD who was transferred from [facility] for NICKY on EKG c/f late-presentation STEMI. TTE performed with evidence of wall thinning which could lead to mechanical complications with intervention. Coronary angiography deferred for medical treatment. Found to have LV thrombus.     Cardiac studies:   3/7/24 TTE - LV systolic function moderately decreased EF 40%, entire apex, mid and apical anterior septum, mid and apical inferior septum, and mid and apical inferior wall are abnormal; echo-dense mass suggestive of a LV thrombus located in the apex     #late-presentation AWSTEMI   #LV thrombus   #HFrEF  Received ACS protocol with ASA and plavix loading, heparin gtt. Will need minimum 3 months of AC for LV thrombus.  - c/w Plavix. Bridge to COumadin with INR 3.44 today. Dc heparin gt  - c/w atorvastatin 80mg   - Continue Toprol 50mg daily  - Increased Losartan to 50mg daily. Note Cre bump to 1.42. CTM for now. Replete K/Mg to 4/2 respectively   - Farxiga on discharge     Note not final until signed by attending      84 yo Cantonese speaking man with history of HLD who was transferred from [facility] for NICKY on EKG c/f late-presentation STEMI. TTE performed with evidence of wall thinning which could lead to mechanical complications with intervention. Coronary angiography deferred for medical treatment. Found to have LV thrombus.     Cardiac studies:   3/7/24 TTE - LV systolic function moderately decreased EF 40%, entire apex, mid and apical anterior septum, mid and apical inferior septum, and mid and apical inferior wall are abnormal; echo-dense mass suggestive of a LV thrombus located in the apex     #late-presentation AWSTEMI   #LV thrombus   #HFrEF  Received ACS protocol with ASA and plavix loading, heparin gtt. Will need minimum 3 months of AC for LV thrombus.  - c/w Plavix. Bridge to COumadin with INR 3.44 today. Dc heparin gt  - c/w atorvastatin 80mg   - Continue Toprol 50mg daily  - Increased Losartan to 50mg daily. Note Cre bump to 1.42; marginal decline in kidney function is anticipated after starting ACE/ARB/ARNI's. Would hold tomorrow AM dose pending BMP result to ensure kidney function is stable/improving. Replete K/Mg to 4/2 respectively   - Farxiga on discharge     Note not final until signed by attending

## 2024-03-10 NOTE — PROGRESS NOTE ADULT - SUBJECTIVE AND OBJECTIVE BOX
Leonel Porter MD  Cardiology Fellow  [phone]  All Cardiology service information can be found 24/7 on [email or web address], password: [email or web address]    Patient seen and examined at bedside.  AF HR 60s-80s BP 90s-120s/50s-60s satting % Ra  Tele NSR   INR 1.03 > 1.8 >     Review Of Systems: No chest pain, shortness of breath, or palpitations            Current Meds:  acetaminophen     Tablet .. 650 milliGRAM(s) Oral every 6 hours PRN  aluminum hydroxide/magnesium hydroxide/simethicone Suspension 30 milliLiter(s) Oral every 4 hours PRN  atorvastatin 80 milliGRAM(s) Oral at bedtime  clopidogrel Tablet 75 milliGRAM(s) Oral daily  heparin   Injectable 2900 Unit(s) IV Push every 6 hours PRN  heparin  Infusion.  Unit(s)/Hr IV Continuous <Continuous>  losartan 50 milliGRAM(s) Oral daily  melatonin 3 milliGRAM(s) Oral at bedtime PRN  metoprolol succinate ER 50 milliGRAM(s) Oral daily  ondansetron Injectable 4 milliGRAM(s) IV Push every 8 hours PRN  polyethylene glycol 3350 17 Gram(s) Oral daily  senna 2 Tablet(s) Oral at bedtime  tamsulosin 0.4 milliGRAM(s) Oral at bedtime    Vitals:  T(F): 98.9 (03-10), Max: 98.9 (03-10)  HR: 67 (03-10) (60 - 79)  BP: 107/61 (03-10) (98/55 - 107/61)  RR: 18 (03-10)  SpO2: 96% (03-10)  I&O's Summary    08 Mar 2024 07:01  -  09 Mar 2024 07:00  --------------------------------------------------------  IN: 580 mL / OUT: 0 mL / NET: 580 mL    09 Mar 2024 06:01  -  10 Mar 2024 06:42  --------------------------------------------------------  IN: 640 mL / OUT: 0 mL / NET: 640 mL    Physical Exam:  General: NAD, non-toxic appearing. [place]  HEENT: PERRLA, EOMi, no scleral icterus  CV: RRR, normal S1 and S2, no m/r/g  Lungs: normal respiratory effort. CTAB, no wheezes, rales, or rhonchi  Abd: soft, nontender, nondistended  Ext: no edema, 2+ peripheral pulses   Pysch: AAOx3, appropriate affect   Skin: no rashes or lesions                           11.6   6.60  )-----------( 134      ( 09 Mar 2024 07:31 )             34.4     03-09    141  |  109<H>  |  30<H>  ----------------------------<  90  4.1   |  22  |  1.23    Ca    8.8      09 Mar 2024 07:35  Phos  2.5     03-09  Mg     2.2     03-09      PT/INR - ( 09 Mar 2024 07:31 )   PT: 18.6 sec;   INR: 1.80 ratio         PTT - ( 09 Mar 2024 07:31 )  PTT:68.4 sec  CARDIAC MARKERS ( 07 Mar 2024 06:15 )  4083 ng/L / x     / x     / 851 U/L / x     / x      CARDIAC MARKERS ( 06 Mar 2024 20:12 )      TTE 3/7/24  CONCLUSIONS:   1. Left ventricular cavity is normal in size. Left ventricular wall thickness is normal. Left ventricular systolic function is moderately decreased. Regional wall motion abnormalities present.   2. Entire apex, mid and apical anterior septum, mid and apical inferior septum, and mid and apical inferior wall are abnormal.   3. There is an echo-dense mass, suggestive of a left ventricular thrombus located in the apex.    5156 ng/L / x     / x     / x     / x     / x     Leonel Porter MD  Cardiology Fellow  [phone]  All Cardiology service information can be found 24/7 on [email or web address], password: [email or web address]    Patient seen and examined at bedside.  AF HR 60s-80s BP 90s-120s/50s-60s satting % Ra  Tele NSR   INR 1.03 > 1.8 > 3.44   I spoke to patient in Cantonese this AM and he denied any lightheadedness, chest pain or otherwise    Review Of Systems: No chest pain, shortness of breath, or palpitations            Current Meds:  acetaminophen     Tablet .. 650 milliGRAM(s) Oral every 6 hours PRN  aluminum hydroxide/magnesium hydroxide/simethicone Suspension 30 milliLiter(s) Oral every 4 hours PRN  atorvastatin 80 milliGRAM(s) Oral at bedtime  clopidogrel Tablet 75 milliGRAM(s) Oral daily  heparin   Injectable 2900 Unit(s) IV Push every 6 hours PRN  heparin  Infusion.  Unit(s)/Hr IV Continuous <Continuous>  losartan 50 milliGRAM(s) Oral daily  melatonin 3 milliGRAM(s) Oral at bedtime PRN  metoprolol succinate ER 50 milliGRAM(s) Oral daily  ondansetron Injectable 4 milliGRAM(s) IV Push every 8 hours PRN  polyethylene glycol 3350 17 Gram(s) Oral daily  senna 2 Tablet(s) Oral at bedtime  tamsulosin 0.4 milliGRAM(s) Oral at bedtime    Vitals:  T(F): 98.9 (03-10), Max: 98.9 (03-10)  HR: 67 (03-10) (60 - 79)  BP: 107/61 (03-10) (98/55 - 107/61)  RR: 18 (03-10)  SpO2: 96% (03-10)  I&O's Summary    08 Mar 2024 07:01  -  09 Mar 2024 07:00  --------------------------------------------------------  IN: 580 mL / OUT: 0 mL / NET: 580 mL    09 Mar 2024 06:01  -  10 Mar 2024 06:42  --------------------------------------------------------  IN: 640 mL / OUT: 0 mL / NET: 640 mL    Physical Exam:  General: NAD, non-toxic appearing. [place]  HEENT: PERRLA, EOMi, no scleral icterus  CV: RRR, normal S1 and S2, no m/r/g  Lungs: normal respiratory effort. CTAB, no wheezes, rales, or rhonchi  Abd: soft, nontender, nondistended  Ext: no edema, 2+ peripheral pulses   Pysch: AAOx3, appropriate affect   Skin: no rashes or lesions                           11.6   6.60  )-----------( 134      ( 09 Mar 2024 07:31 )             34.4     03-09    141  |  109<H>  |  30<H>  ----------------------------<  90  4.1   |  22  |  1.23    Ca    8.8      09 Mar 2024 07:35  Phos  2.5     03-09  Mg     2.2     03-09      PT/INR - ( 09 Mar 2024 07:31 )   PT: 18.6 sec;   INR: 1.80 ratio         PTT - ( 09 Mar 2024 07:31 )  PTT:68.4 sec  CARDIAC MARKERS ( 07 Mar 2024 06:15 )  4083 ng/L / x     / x     / 851 U/L / x     / x      CARDIAC MARKERS ( 06 Mar 2024 20:12 )      TTE 3/7/24  CONCLUSIONS:   1. Left ventricular cavity is normal in size. Left ventricular wall thickness is normal. Left ventricular systolic function is moderately decreased. Regional wall motion abnormalities present.   2. Entire apex, mid and apical anterior septum, mid and apical inferior septum, and mid and apical inferior wall are abnormal.   3. There is an echo-dense mass, suggestive of a left ventricular thrombus located in the apex.    5156 ng/L / x     / x     / x     / x     / x

## 2024-03-10 NOTE — PROGRESS NOTE ADULT - SUBJECTIVE AND OBJECTIVE BOX
[facility] Division of Hospital Medicine  Blaze Hensley DO  Reachable on [employer]    Patient is a 83y old  Male who presents with a chief complaint of chest pain (10 Mar 2024 06:41)    SUBJECTIVE / OVERNIGHT EVENTS: No acute events overnight. Patient seen and examined at bedside this morning, feels well, no chest pain or shortness of breath.    REVIEW OF SYSTEMS:    CONSTITUTIONAL: No weakness, fevers or chills  EYES/ENT: No visual changes;  No vertigo or throat pain   NECK: No pain or stiffness  RESPIRATORY: No cough, wheezing, hemoptysis; No shortness of breath  CARDIOVASCULAR: No chest pain or palpitations  GASTROINTESTINAL: No abdominal or epigastric pain. No nausea, vomiting, or hematemesis; No diarrhea or constipation. No melena or hematochezia.  GENITOURINARY: No dysuria, frequency or hematuria  NEUROLOGICAL: No numbness or weakness  SKIN: No itching, burning, rashes, or lesions  MSK: No joint pain, no back pain  HEME: No easy bleeding, no easy bruising  All other review of systems is negative unless indicated above.    MEDICATIONS  (STANDING):  atorvastatin 80 milliGRAM(s) Oral at bedtime  clopidogrel Tablet 75 milliGRAM(s) Oral daily  lactated ringers. 500 milliLiter(s) (50 mL/Hr) IV Continuous <Continuous>  metoprolol succinate ER 50 milliGRAM(s) Oral daily  polyethylene glycol 3350 17 Gram(s) Oral daily  senna 2 Tablet(s) Oral at bedtime  tamsulosin 0.4 milliGRAM(s) Oral at bedtime    MEDICATIONS  (PRN):  acetaminophen     Tablet .. 650 milliGRAM(s) Oral every 6 hours PRN Temp greater or equal to 38C (100.4F), Mild Pain (1 - 3)  aluminum hydroxide/magnesium hydroxide/simethicone Suspension 30 milliLiter(s) Oral every 4 hours PRN Dyspepsia  melatonin 3 milliGRAM(s) Oral at bedtime PRN Insomnia  ondansetron Injectable 4 milliGRAM(s) IV Push every 8 hours PRN Nausea and/or Vomiting      CAPILLARY BLOOD GLUCOSE        I&O's Summary    09 Mar 2024 06:01  -  10 Mar 2024 07:00  --------------------------------------------------------  IN: 640 mL / OUT: 0 mL / NET: 640 mL        PHYSICAL EXAM:  Vital Signs Last 24 Hrs  T(C): 37.1 (10 Mar 2024 12:45), Max: 37.2 (10 Mar 2024 04:42)  T(F): 98.7 (10 Mar 2024 12:45), Max: 98.9 (10 Mar 2024 04:42)  HR: 60 (10 Mar 2024 12:45) (60 - 79)  BP: 94/54 (10 Mar 2024 12:45) (94/54 - 107/61)  BP(mean): --  RR: 18 (10 Mar 2024 12:45) (18 - 18)  SpO2: 99% (10 Mar 2024 12:45) (96% - 100%)    Parameters below as of 10 Mar 2024 04:42  Patient On (Oxygen Delivery Method): room air    CONSTITUTIONAL: NAD, well-developed, well-groomed  RESPIRATORY: Normal respiratory effort; lungs are clear to auscultation bilaterally  CARDIOVASCULAR: Regular rate and rhythm, normal S1 and S2, no murmur/rub/gallop  ABDOMEN: Nontender to palpation, soft, nondistended  PSYCH: A+O to person, and time but not place  NEUROLOGY: Hard of hearing; no gross sensory deficits    LABS:                        11.1   5.94  )-----------( 135      ( 10 Mar 2024 07:23 )             34.8     03-10    144  |  113<H>  |  35<H>  ----------------------------<  107<H>  3.8   |  22  |  1.42<H>    Ca    9.2      10 Mar 2024 07:23  Phos  3.5     03-10  Mg     2.4     03-10      PT/INR - ( 10 Mar 2024 07:23 )   PT: 34.8 sec;   INR: 3.44 ratio         PTT - ( 10 Mar 2024 07:23 )  PTT:102.1 sec      Urinalysis Basic - ( 10 Mar 2024 07:23 )    Color: x / Appearance: x / SG: x / pH: x  Gluc: 107 mg/dL / Ketone: x  / Bili: x / Urobili: x   Blood: x / Protein: x / Nitrite: x   Leuk Esterase: x / RBC: x / WBC x   Sq Epi: x / Non Sq Epi: x / Bacteria: x

## 2024-03-10 NOTE — PROGRESS NOTE ADULT - ATTENDING COMMENTS
Patient seen and examined. Agree with assessment and plan as outlined above. Continue GDMT for late presentation MI. Hep to warfarin for LV thrombus.
83 year old man transferred from [facility], late presentation MI, symptoms resolved prior to transfer, troponin appears to have peaked and is declining. He is in no distress, lungs are clear, cardiac rhythm regular without murmurs and there is no edema. EKG consistent with anterior wall STEMI. On Heparin infusion, loaded with aspirin and clopidogrel. Cardiac echo demonstrates apical thrombus, akinetic and thinned septum and apex. Interventional cardiology deeming this a late presentation with no further chest pain concludes coronary angiography for purpose of intervention unlikely to have beneficial yield and does pose risk of reperfusion injury. Thus managing medically, maintaining heparin pending therapeutic INR on warfarin and instituting GDMT.    To contact call Cardiology Fellow or Attending as listed on [email or web address] password: [email or web address].
Patient seen and examined. Agree with assessment and plan as outlined above. Optimizing GDMT. Hep to warfain. INR 3.4 today.

## 2024-03-11 LAB
ANION GAP SERPL CALC-SCNC: 11 MMOL/L — SIGNIFICANT CHANGE UP (ref 5–17)
BUN SERPL-MCNC: 39 MG/DL — HIGH (ref 7–23)
CALCIUM SERPL-MCNC: 9 MG/DL — SIGNIFICANT CHANGE UP (ref 8.4–10.5)
CHLORIDE SERPL-SCNC: 114 MMOL/L — HIGH (ref 96–108)
CO2 SERPL-SCNC: 20 MMOL/L — LOW (ref 22–31)
CREAT SERPL-MCNC: 1.77 MG/DL — HIGH (ref 0.5–1.3)
EGFR: 37 ML/MIN/1.73M2 — LOW
GLUCOSE SERPL-MCNC: 104 MG/DL — HIGH (ref 70–99)
HCT VFR BLD CALC: 34.7 % — LOW (ref 39–50)
HGB BLD-MCNC: 11 G/DL — LOW (ref 13–17)
INR BLD: 3.44 RATIO — HIGH (ref 0.85–1.18)
MAGNESIUM SERPL-MCNC: 2.3 MG/DL — SIGNIFICANT CHANGE UP (ref 1.6–2.6)
MCHC RBC-ENTMCNC: 30 PG — SIGNIFICANT CHANGE UP (ref 27–34)
MCHC RBC-ENTMCNC: 31.7 GM/DL — LOW (ref 32–36)
MCV RBC AUTO: 94.6 FL — SIGNIFICANT CHANGE UP (ref 80–100)
NRBC # BLD: 0 /100 WBCS — SIGNIFICANT CHANGE UP (ref 0–0)
PHOSPHATE SERPL-MCNC: 3.7 MG/DL — SIGNIFICANT CHANGE UP (ref 2.5–4.5)
PLATELET # BLD AUTO: 145 K/UL — LOW (ref 150–400)
POTASSIUM SERPL-MCNC: 4.1 MMOL/L — SIGNIFICANT CHANGE UP (ref 3.5–5.3)
POTASSIUM SERPL-SCNC: 4.1 MMOL/L — SIGNIFICANT CHANGE UP (ref 3.5–5.3)
PROTHROM AB SERPL-ACNC: 34.8 SEC — HIGH (ref 9.5–13)
RBC # BLD: 3.67 M/UL — LOW (ref 4.2–5.8)
RBC # FLD: 13.6 % — SIGNIFICANT CHANGE UP (ref 10.3–14.5)
SODIUM SERPL-SCNC: 145 MMOL/L — SIGNIFICANT CHANGE UP (ref 135–145)
WBC # BLD: 6.71 K/UL — SIGNIFICANT CHANGE UP (ref 3.8–10.5)
WBC # FLD AUTO: 6.71 K/UL — SIGNIFICANT CHANGE UP (ref 3.8–10.5)

## 2024-03-11 PROCEDURE — 76770 US EXAM ABDO BACK WALL COMP: CPT | Mod: 26

## 2024-03-11 PROCEDURE — 99233 SBSQ HOSP IP/OBS HIGH 50: CPT

## 2024-03-11 PROCEDURE — 99232 SBSQ HOSP IP/OBS MODERATE 35: CPT

## 2024-03-11 RX ORDER — WARFARIN SODIUM 2.5 MG/1
2 TABLET ORAL ONCE
Refills: 0 | Status: COMPLETED | OUTPATIENT
Start: 2024-03-11 | End: 2024-03-11

## 2024-03-11 RX ADMIN — Medication 50 MILLIGRAM(S): at 09:55

## 2024-03-11 RX ADMIN — SENNA PLUS 2 TABLET(S): 8.6 TABLET ORAL at 22:18

## 2024-03-11 RX ADMIN — ATORVASTATIN CALCIUM 80 MILLIGRAM(S): 80 TABLET, FILM COATED ORAL at 22:18

## 2024-03-11 RX ADMIN — TAMSULOSIN HYDROCHLORIDE 0.4 MILLIGRAM(S): 0.4 CAPSULE ORAL at 22:18

## 2024-03-11 RX ADMIN — CLOPIDOGREL BISULFATE 75 MILLIGRAM(S): 75 TABLET, FILM COATED ORAL at 11:31

## 2024-03-11 RX ADMIN — POLYETHYLENE GLYCOL 3350 17 GRAM(S): 17 POWDER, FOR SOLUTION ORAL at 11:31

## 2024-03-11 RX ADMIN — WARFARIN SODIUM 2 MILLIGRAM(S): 2.5 TABLET ORAL at 22:18

## 2024-03-11 NOTE — PROGRESS NOTE ADULT - SUBJECTIVE AND OBJECTIVE BOX
Patient seen and examined at bedside.    Overnight Events:   NAEON  No acute complaints this AM  INR Supratherepeutic    REVIEW OF SYSTEMS:  All other review of systems is negative unless indicated above.            Current Meds:  acetaminophen     Tablet .. 650 milliGRAM(s) Oral every 6 hours PRN  aluminum hydroxide/magnesium hydroxide/simethicone Suspension 30 milliLiter(s) Oral every 4 hours PRN  atorvastatin 80 milliGRAM(s) Oral at bedtime  clopidogrel Tablet 75 milliGRAM(s) Oral daily  lactated ringers. 500 milliLiter(s) IV Continuous <Continuous>  melatonin 3 milliGRAM(s) Oral at bedtime PRN  metoprolol succinate ER 50 milliGRAM(s) Oral daily  ondansetron Injectable 4 milliGRAM(s) IV Push every 8 hours PRN  polyethylene glycol 3350 17 Gram(s) Oral daily  senna 2 Tablet(s) Oral at bedtime  tamsulosin 0.4 milliGRAM(s) Oral at bedtime      Vitals:  T(F): 97.8 (03-11), Max: 99 (03-10)  HR: 76 (03-11) (56 - 76)  BP: 118/62 (03-11) (90/52 - 119/70)  RR: 18 (03-11)  SpO2: 99% (03-11)  I&O's Summary    10 Mar 2024 07:01  -  11 Mar 2024 07:00  --------------------------------------------------------  IN: 540 mL / OUT: 0 mL / NET: 540 mL    11 Mar 2024 07:01  -  11 Mar 2024 10:57  --------------------------------------------------------  IN: 120 mL / OUT: 0 mL / NET: 120 mL        Physical Exam:  Appearance: No acute distress; well appearing  Eyes:  EOMI  HEENT: Normal oral mucosa  Cardiovascular: RRR, S1, S2, no murmurs, rubs, or gallops; no edema; no JVD  Respiratory: Clear to auscultation bilaterally  Gastrointestinal: soft, non-tender, non-distended  Musculoskeletal: No clubbing;  Neurologic: Non-focal  Psychiatry: AAOx3, mood & affect appropriate                          11.0   6.71  )-----------( 145      ( 11 Mar 2024 06:34 )             34.7     03-11    145  |  114<H>  |  39<H>  ----------------------------<  104<H>  4.1   |  20<L>  |  1.77<H>    Ca    9.0      11 Mar 2024 06:34  Phos  3.7     03-11  Mg     2.3     03-11      PT/INR - ( 11 Mar 2024 06:34 )   PT: 34.8 sec;   INR: 3.44 ratio         PTT - ( 10 Mar 2024 07:23 )  PTT:102.1 sec    TTE 3/7/24  CONCLUSIONS:   1. Left ventricular cavity is normal in size. Left ventricular wall thickness is normal. Left ventricular systolic function is moderately decreased. Regional wall motion abnormalities present.   2. Entire apex, mid and apical anterior septum, mid and apical inferior septum, and mid and apical inferior wall are abnormal.   3. There is an echo-dense mass, suggestive of a left ventricular thrombus located in the apex.

## 2024-03-11 NOTE — PROGRESS NOTE ADULT - ASSESSMENT
82 yo Cantonese speaking man with history of HLD who was transferred from [facility] for NICKY on EKG c/f late-presentation STEMI. TTE performed with evidence of wall thinning which could lead to mechanical complications with intervention. Coronary angiography deferred for medical treatment. Found to have LV thrombus.     Cardiac studies:   3/7/24 TTE - LV systolic function moderately decreased EF 40%, entire apex, mid and apical anterior septum, mid and apical inferior septum, and mid and apical inferior wall are abnormal; echo-dense mass suggestive of a LV thrombus located in the apex     #late-presentation AWSTEMI   #LV thrombus   #HFrEF  Received ACS protocol with ASA and plavix loading, heparin gtt. Will need minimum 3 months of AC for LV thrombus.  - c/w Plavix. Bridge to COumadin with INR 3.44 today. Dc heparin gt. INR goal 2-3  - c/w atorvastatin 80mg   - Continue Toprol 50mg daily  - losartan on hold given OLMAN - would resume once renal function normalizes  - Farxiga on discharge if renal function normalizes  - patient can follow-up with Dr. Kanu Barron in [place] - please call [phone] to schedule follow-up  - Cardiology to sign-off, please call back with any questions/concerns    Please see attending attestation for final recommendations        Getachew Ferguson MD  Cardiology Fellow     All Cardiology service information can be found 24/7 on [email or web address], password: [email or web address]  MILLA CID is an 84 yo Cantonese speaking man with history of HLD who was transferred from [facility] for NICKY on EKG c/f late-presentation STEMI. TTE performed with evidence of wall thinning which could lead to mechanical complications with intervention. Coronary angiography deferred for medical treatment. Found to have LV thrombus.  Will plan for medical therapy at this time and outpt cardiology fu    Cardiac studies:   3/7/24 TTE - LV systolic function moderately decreased EF 40%, entire apex, mid and apical anterior septum, mid and apical inferior septum, and mid and apical inferior wall are abnormal; echo-dense mass suggestive of a LV thrombus located in the apex     #late-presentation AWSTEMI   #LV thrombus   #HFrEF  Received ACS protocol with ASA and plavix loading, heparin gtt. Will need minimum 3 months of AC for LV thrombus.  - c/w Plavix. Bridge to COumadin with INR 3.44 today. Dc heparin gt. INR goal 2-3  - c/w atorvastatin 80mg   - Continue Toprol 50mg daily  - losartan on hold given OLMAN - would resume once renal function normalizes  - Farxiga on discharge if renal function normalizes  - patient can follow-up with Dr. Kanu Barron in [place] - please call [phone] to schedule follow-up  - Cardiology to sign-off, please call back with any questions/concerns    Please see attending attestation for final recommendations        final cardiology recommendations above, please call with questions      Getachew Ferguson MD  Cardiology Fellow     All Cardiology service information can be found 24/7 on [email or web address], password: [email or web address]     Patient seen and examined with the fellow, the note above has been edited to reflect my independent history, physical exam, assessment and plan.      Kadeem Gardner MD, PhD  Cardiology Attending  [facility]    For day time coverage Mon-Fri see Non-Service Consult Attending on [email or web address], password: [email or web address]; daytime weekends covered by general cardiology consult service attending.)

## 2024-03-11 NOTE — PROGRESS NOTE ADULT - ASSESSMENT
84yo 50kg m w pmh hld presented to [facility] with chest pain; at [facility] er, found to have findings suggestive of stemi (ekg w panfilo in anterior precordial leads + elevated troponin); loaded w dapt, started on ufh infusion, and transferred to [facility] er for further mgmt; in er, c/f late presentation stemi (repeat ekg w q waves and resolving panfilo in ant precordial leads v2-v4 + elevated troponin); admit to medicine for further mgmt

## 2024-03-11 NOTE — PROGRESS NOTE ADULT - SUBJECTIVE AND OBJECTIVE BOX
no events overnight.    GENERAL: No fevers, no chills.  EYES: No blurry vision,  No photophobia  ENT: No sore throat.  No dysphagia  Cardiovascular: No chest pain, palpitations, orthopnea  Pulmonary: No cough, no wheezing. No shortness of breath  Gastrointestinal: No abdominal pain, no diarrhea, no constipation.    Musculoskeletal: No weakness.  No myalgias.  Dermatology:  No rashes.  Neuro: No Headache.  No vertigo.  No dizziness.  Psych: No anxiety, no depression.  Denies suicidal thoughts.    MEDICATIONS  (STANDING):  atorvastatin 80 milliGRAM(s) Oral at bedtime  clopidogrel Tablet 75 milliGRAM(s) Oral daily  metoprolol succinate ER 50 milliGRAM(s) Oral daily  polyethylene glycol 3350 17 Gram(s) Oral daily  senna 2 Tablet(s) Oral at bedtime  tamsulosin 0.4 milliGRAM(s) Oral at bedtime    MEDICATIONS  (PRN):  acetaminophen     Tablet .. 650 milliGRAM(s) Oral every 6 hours PRN Temp greater or equal to 38C (100.4F), Mild Pain (1 - 3)  aluminum hydroxide/magnesium hydroxide/simethicone Suspension 30 milliLiter(s) Oral every 4 hours PRN Dyspepsia  melatonin 3 milliGRAM(s) Oral at bedtime PRN Insomnia  ondansetron Injectable 4 milliGRAM(s) IV Push every 8 hours PRN Nausea and/or Vomiting    Vital Signs Last 24 Hrs  T(C): 36.9 (11 Mar 2024 11:51), Max: 37.2 (10 Mar 2024 20:07)  T(F): 98.4 (11 Mar 2024 11:51), Max: 99 (10 Mar 2024 20:07)  HR: 64 (11 Mar 2024 11:51) (56 - 76)  BP: 112/63 (11 Mar 2024 11:51) (90/52 - 119/70)  BP(mean): --  RR: 18 (11 Mar 2024 11:51) (18 - 18)  SpO2: 100% (11 Mar 2024 11:51) (99% - 100%)    Parameters below as of 11 Mar 2024 11:51  Patient On (Oxygen Delivery Method): room air    CONSTITUTIONAL: No weakness, fevers or chills  EYES/ENT: No visual changes;  No vertigo or throat pain   NECK: No pain or stiffness  RESPIRATORY: No cough, wheezing, hemoptysis; No shortness of breath  CARDIOVASCULAR: No chest pain or palpitations  GASTROINTESTINAL: No abdominal or epigastric pain. No nausea, vomiting, or hematemesis  GENITOURINARY: No dysuria, frequency or hematuria  NEUROLOGICAL: No numbness or weakness  SKIN: No itching, burning, rashes, or lesions  MSK: No joint pain, no back pain  HEME: No easy bleeding, no easy bruising    MEDICATIONS  (STANDING):  atorvastatin 80 milliGRAM(s) Oral at bedtime  clopidogrel Tablet 75 milliGRAM(s) Oral daily  metoprolol succinate ER 50 milliGRAM(s) Oral daily  polyethylene glycol 3350 17 Gram(s) Oral daily  senna 2 Tablet(s) Oral at bedtime  tamsulosin 0.4 milliGRAM(s) Oral at bedtime    MEDICATIONS  (PRN):  acetaminophen     Tablet .. 650 milliGRAM(s) Oral every 6 hours PRN Temp greater or equal to 38C (100.4F), Mild Pain (1 - 3)  aluminum hydroxide/magnesium hydroxide/simethicone Suspension 30 milliLiter(s) Oral every 4 hours PRN Dyspepsia  melatonin 3 milliGRAM(s) Oral at bedtime PRN Insomnia  ondansetron Injectable 4 milliGRAM(s) IV Push every 8 hours PRN Nausea and/or Vomiting    Vital Signs Last 24 Hrs  T(C): 36.9 (11 Mar 2024 11:51), Max: 37.2 (10 Mar 2024 20:07)  T(F): 98.4 (11 Mar 2024 11:51), Max: 99 (10 Mar 2024 20:07)  HR: 64 (11 Mar 2024 11:51) (56 - 76)  BP: 112/63 (11 Mar 2024 11:51) (90/52 - 119/70)  BP(mean): --  RR: 18 (11 Mar 2024 11:51) (18 - 18)  SpO2: 100% (11 Mar 2024 11:51) (99% - 100%)    Parameters below as of 11 Mar 2024 11:51  Patient On (Oxygen Delivery Method): room air    CONSTITUTIONAL: NAD  RESPIRATORY: Normal respiratory effort; lungs are clear to auscultation bilaterally  CARDIOVASCULAR: Regular rate and rhythm, normal S1 and S2, no murmur/rub/gallop  ABDOMEN: Nontender to palpation, soft, nondistended  PSYCH: A+O to person, and time but not place  NEUROLOGY: Hard of hearing; no gross sensory deficits    .  LABS:                         11.0   6.71  )-----------( 145      ( 11 Mar 2024 06:34 )             34.7     03-11    145  |  114<H>  |  39<H>  ----------------------------<  104<H>  4.1   |  20<L>  |  1.77<H>    Ca    9.0      11 Mar 2024 06:34  Phos  3.7     03-11  Mg     2.3     03-11      PT/INR - ( 11 Mar 2024 06:34 )   PT: 34.8 sec;   INR: 3.44 ratio         PTT - ( 10 Mar 2024 07:23 )  PTT:102.1 sec  Urinalysis Basic - ( 11 Mar 2024 06:34 )    Color: x / Appearance: x / SG: x / pH: x  Gluc: 104 mg/dL / Ketone: x  / Bili: x / Urobili: x   Blood: x / Protein: x / Nitrite: x   Leuk Esterase: x / RBC: x / WBC x   Sq Epi: x / Non Sq Epi: x / Bacteria: x            RADIOLOGY, EKG & ADDITIONAL TESTS: Reviewed.

## 2024-03-12 VITALS
TEMPERATURE: 98 F | RESPIRATION RATE: 18 BRPM | OXYGEN SATURATION: 99 % | SYSTOLIC BLOOD PRESSURE: 110 MMHG | HEART RATE: 60 BPM | DIASTOLIC BLOOD PRESSURE: 65 MMHG

## 2024-03-12 LAB
ANION GAP SERPL CALC-SCNC: 9 MMOL/L — SIGNIFICANT CHANGE UP (ref 5–17)
BUN SERPL-MCNC: 37 MG/DL — HIGH (ref 7–23)
CALCIUM SERPL-MCNC: 9.4 MG/DL — SIGNIFICANT CHANGE UP (ref 8.4–10.5)
CHLORIDE SERPL-SCNC: 114 MMOL/L — HIGH (ref 96–108)
CO2 SERPL-SCNC: 20 MMOL/L — LOW (ref 22–31)
CREAT SERPL-MCNC: 1.36 MG/DL — HIGH (ref 0.5–1.3)
EGFR: 51 ML/MIN/1.73M2 — LOW
GLUCOSE SERPL-MCNC: 94 MG/DL — SIGNIFICANT CHANGE UP (ref 70–99)
HCT VFR BLD CALC: 36.8 % — LOW (ref 39–50)
HGB BLD-MCNC: 11.5 G/DL — LOW (ref 13–17)
INR BLD: 3.58 RATIO — HIGH (ref 0.85–1.18)
MCHC RBC-ENTMCNC: 30.2 PG — SIGNIFICANT CHANGE UP (ref 27–34)
MCHC RBC-ENTMCNC: 31.3 GM/DL — LOW (ref 32–36)
MCV RBC AUTO: 96.6 FL — SIGNIFICANT CHANGE UP (ref 80–100)
NRBC # BLD: 0 /100 WBCS — SIGNIFICANT CHANGE UP (ref 0–0)
PLATELET # BLD AUTO: 152 K/UL — SIGNIFICANT CHANGE UP (ref 150–400)
POTASSIUM SERPL-MCNC: 4.3 MMOL/L — SIGNIFICANT CHANGE UP (ref 3.5–5.3)
POTASSIUM SERPL-SCNC: 4.3 MMOL/L — SIGNIFICANT CHANGE UP (ref 3.5–5.3)
PROTHROM AB SERPL-ACNC: 36.2 SEC — HIGH (ref 9.5–13)
RBC # BLD: 3.81 M/UL — LOW (ref 4.2–5.8)
RBC # FLD: 13.8 % — SIGNIFICANT CHANGE UP (ref 10.3–14.5)
SODIUM SERPL-SCNC: 143 MMOL/L — SIGNIFICANT CHANGE UP (ref 135–145)
WBC # BLD: 6.96 K/UL — SIGNIFICANT CHANGE UP (ref 3.8–10.5)
WBC # FLD AUTO: 6.96 K/UL — SIGNIFICANT CHANGE UP (ref 3.8–10.5)

## 2024-03-12 PROCEDURE — 99285 EMERGENCY DEPT VISIT HI MDM: CPT | Mod: 25

## 2024-03-12 PROCEDURE — 83690 ASSAY OF LIPASE: CPT

## 2024-03-12 PROCEDURE — 80048 BASIC METABOLIC PNL TOTAL CA: CPT

## 2024-03-12 PROCEDURE — 85730 THROMBOPLASTIN TIME PARTIAL: CPT

## 2024-03-12 PROCEDURE — C8929: CPT

## 2024-03-12 PROCEDURE — 97161 PT EVAL LOW COMPLEX 20 MIN: CPT

## 2024-03-12 PROCEDURE — 80061 LIPID PANEL: CPT

## 2024-03-12 PROCEDURE — 93308 TTE F-UP OR LMTD: CPT

## 2024-03-12 PROCEDURE — 84484 ASSAY OF TROPONIN QUANT: CPT

## 2024-03-12 PROCEDURE — 83735 ASSAY OF MAGNESIUM: CPT

## 2024-03-12 PROCEDURE — 85610 PROTHROMBIN TIME: CPT

## 2024-03-12 PROCEDURE — 82550 ASSAY OF CK (CPK): CPT

## 2024-03-12 PROCEDURE — 99239 HOSP IP/OBS DSCHRG MGMT >30: CPT

## 2024-03-12 PROCEDURE — 84100 ASSAY OF PHOSPHORUS: CPT

## 2024-03-12 PROCEDURE — 80053 COMPREHEN METABOLIC PANEL: CPT

## 2024-03-12 PROCEDURE — 83880 ASSAY OF NATRIURETIC PEPTIDE: CPT

## 2024-03-12 PROCEDURE — 76770 US EXAM ABDO BACK WALL COMP: CPT

## 2024-03-12 PROCEDURE — 36415 COLL VENOUS BLD VENIPUNCTURE: CPT

## 2024-03-12 PROCEDURE — 96374 THER/PROPH/DIAG INJ IV PUSH: CPT

## 2024-03-12 PROCEDURE — 97166 OT EVAL MOD COMPLEX 45 MIN: CPT

## 2024-03-12 PROCEDURE — 85025 COMPLETE CBC W/AUTO DIFF WBC: CPT

## 2024-03-12 PROCEDURE — 84443 ASSAY THYROID STIM HORMONE: CPT

## 2024-03-12 PROCEDURE — 85027 COMPLETE CBC AUTOMATED: CPT

## 2024-03-12 PROCEDURE — 71045 X-RAY EXAM CHEST 1 VIEW: CPT

## 2024-03-12 PROCEDURE — 83036 HEMOGLOBIN GLYCOSYLATED A1C: CPT

## 2024-03-12 RX ORDER — ATORVASTATIN CALCIUM 80 MG/1
1 TABLET, FILM COATED ORAL
Refills: 0 | DISCHARGE

## 2024-03-12 RX ORDER — METOPROLOL TARTRATE 50 MG
1 TABLET ORAL
Qty: 30 | Refills: 0
Start: 2024-03-12 | End: 2024-04-10

## 2024-03-12 RX ORDER — CLOPIDOGREL BISULFATE 75 MG/1
1 TABLET, FILM COATED ORAL
Qty: 30 | Refills: 0
Start: 2024-03-12 | End: 2024-04-10

## 2024-03-12 RX ORDER — ATORVASTATIN CALCIUM 80 MG/1
1 TABLET, FILM COATED ORAL
Qty: 30 | Refills: 0
Start: 2024-03-12 | End: 2024-04-10

## 2024-03-12 RX ADMIN — Medication 50 MILLIGRAM(S): at 06:03

## 2024-03-12 RX ADMIN — CLOPIDOGREL BISULFATE 75 MILLIGRAM(S): 75 TABLET, FILM COATED ORAL at 11:33

## 2024-03-12 RX ADMIN — POLYETHYLENE GLYCOL 3350 17 GRAM(S): 17 POWDER, FOR SOLUTION ORAL at 11:33

## 2024-03-12 NOTE — PROGRESS NOTE ADULT - PROBLEM SELECTOR PROBLEM 3
OLMAN (acute kidney injury)
OLMAN (acute kidney injury)
BPH (benign prostatic hyperplasia)
BPH (benign prostatic hyperplasia)
OLMAN (acute kidney injury)

## 2024-03-12 NOTE — PROGRESS NOTE ADULT - PROVIDER SPECIALTY LIST ADULT
Cardiology
Hospitalist
Cardiology
Hospitalist

## 2024-03-12 NOTE — PROGRESS NOTE ADULT - NSPROGADDITIONALINFOA_GEN_ALL_CORE
Discussed with patient using  Bernardo [id number], daughter Mao and 3 DSU ACP.
Discussed with patient via  Nava [id number], 3 DSU ACP.
disposition: f/u cr, inr    Natasha Wheeler D.O.  Division of Hospital Medicine  Available on MS Teams
Discussed with patient, daughter, 3 DSU ACP.
disposition: DC 3/12 home  discussed with patients daughter on 3/11    1 hour spent in preparation of discharge     Natasha Wheeler D.O.  Division of Hospital Medicine  Available on MS Teams

## 2024-03-12 NOTE — PROGRESS NOTE ADULT - NUTRITIONAL ASSESSMENT
This patient has been assessed with a concern for Malnutrition and has been determined to have a diagnosis/diagnoses of Underweight (BMI < 19).    This patient is being managed with:   Diet DASH/TLC-  Sodium & Cholesterol Restricted  Entered: Mar  7 2024  3:55AM  

## 2024-03-12 NOTE — PROGRESS NOTE ADULT - SUBJECTIVE AND OBJECTIVE BOX
no events overnight.    GENERAL: No fevers, no chills.  EYES: No blurry vision,  No photophobia  ENT: No sore throat.  No dysphagia  Cardiovascular: No chest pain, palpitations, orthopnea  Pulmonary: No cough, no wheezing. No shortness of breath  Gastrointestinal: No abdominal pain, no diarrhea, no constipation.    Musculoskeletal: No weakness.  No myalgias.  Dermatology:  No rashes.  Neuro: No Headache.  No vertigo.  No dizziness.  Psych: No anxiety, no depression.  Denies suicidal thoughts.    MEDICATIONS  (STANDING):  atorvastatin 80 milliGRAM(s) Oral at bedtime  clopidogrel Tablet 75 milliGRAM(s) Oral daily  metoprolol succinate ER 50 milliGRAM(s) Oral daily  polyethylene glycol 3350 17 Gram(s) Oral daily  senna 2 Tablet(s) Oral at bedtime  tamsulosin 0.4 milliGRAM(s) Oral at bedtime    MEDICATIONS  (PRN):  acetaminophen     Tablet .. 650 milliGRAM(s) Oral every 6 hours PRN Temp greater or equal to 38C (100.4F), Mild Pain (1 - 3)  aluminum hydroxide/magnesium hydroxide/simethicone Suspension 30 milliLiter(s) Oral every 4 hours PRN Dyspepsia  melatonin 3 milliGRAM(s) Oral at bedtime PRN Insomnia  ondansetron Injectable 4 milliGRAM(s) IV Push every 8 hours PRN Nausea and/or Vomiting    Vital Signs Last 24 Hrs  T(C): 36.8 (12 Mar 2024 12:03), Max: 37.1 (11 Mar 2024 20:24)  T(F): 98.2 (12 Mar 2024 12:03), Max: 98.7 (11 Mar 2024 20:24)  HR: 60 (12 Mar 2024 12:03) (59 - 66)  BP: 110/65 (12 Mar 2024 12:03) (107/60 - 115/66)  BP(mean): --  RR: 18 (12 Mar 2024 12:03) (18 - 18)  SpO2: 99% (12 Mar 2024 12:03) (97% - 100%)    Parameters below as of 12 Mar 2024 12:03  Patient On (Oxygen Delivery Method): room air    CONSTITUTIONAL: NAD  RESPIRATORY: Normal respiratory effort; lungs are clear to auscultation bilaterally  CARDIOVASCULAR: Regular rate and rhythm, normal S1 and S2, no murmur/rub/gallop  ABDOMEN: Nontender to palpation, soft, nondistended  PSYCH: A+O to person, and time but not place  NEUROLOGY: Hard of hearing; no gross sensory deficits    .  LABS:                         11.5   6.96  )-----------( 152      ( 12 Mar 2024 07:25 )             36.8     03-12    143  |  114<H>  |  37<H>  ----------------------------<  94  4.3   |  20<L>  |  1.36<H>    Ca    9.4      12 Mar 2024 07:25  Phos  3.7     03-11  Mg     2.3     03-11      PT/INR - ( 12 Mar 2024 07:25 )   PT: 36.2 sec;   INR: 3.58 ratio           Urinalysis Basic - ( 12 Mar 2024 07:25 )    Color: x / Appearance: x / SG: x / pH: x  Gluc: 94 mg/dL / Ketone: x  / Bili: x / Urobili: x   Blood: x / Protein: x / Nitrite: x   Leuk Esterase: x / RBC: x / WBC x   Sq Epi: x / Non Sq Epi: x / Bacteria: x            RADIOLOGY, EKG & ADDITIONAL TESTS: Reviewed.

## 2024-03-12 NOTE — PROGRESS NOTE ADULT - ASSESSMENT
84yo 50kg m w pmh hld presented to [facility] with chest pain; at [facility] er, found to have findings suggestive of stemi (ekg w panfilo in anterior precordial leads + elevated troponin); loaded w dapt, started on ufh infusion, and transferred to [facility] er for further mgmt; in er, c/f late presentation stemi (repeat ekg w q waves and resolving panfilo in ant precordial leads v2-v4 + elevated troponin); admit to medicine for further mgmt

## 2024-03-12 NOTE — PROGRESS NOTE ADULT - PROBLEM SELECTOR PLAN 4
Continue home Flomax 0.4mg daily
Continue home Flomax 0.4mg daily
- c/w home Flomax 0.4mg daily

## 2024-03-12 NOTE — PROGRESS NOTE ADULT - TIME BILLING
Review of tests, imaging, labs, documents, medical management, coordination of care and counseling.

## 2024-03-12 NOTE — DISCHARGE NOTE NURSING/CASE MANAGEMENT/SOCIAL WORK - NSDCPEFALRISK_GEN_ALL_CORE
For information on Fall & Injury Prevention, visit: [email or web address] OR  [email or web address] OR  https://www.cdc.gov/steadi/patient.html

## 2024-03-12 NOTE — PROGRESS NOTE ADULT - PROBLEM SELECTOR PROBLEM 1
Concern for allergic dermatitis from unknown source   Will use pataday eye drops for eye itchiness   Will use benadryl after returning from work to allow for sleeping and itchiness relief   Will use zyrtec prior to work for non-drowsy relief   Patient agreeable with plan of care and will call office if no improvement in symptoms   Patient to return for further evaluation if worsening in symptoms  
ACS (acute coronary syndrome)

## 2024-03-12 NOTE — DISCHARGE NOTE NURSING/CASE MANAGEMENT/SOCIAL WORK - PATIENT PORTAL LINK FT
You can access the FollowMyHealth Patient Portal offered by [facility] by registering at the following website: [email or web address]. By joining [employer]s FollowMyHealth portal, you will also be able to view your health information using other applications (apps) compatible with our system.

## 2024-03-12 NOTE — PROGRESS NOTE ADULT - PROBLEM SELECTOR PLAN 1
Patient presenting from OSH with late presenting MI  Cards consulted, no cardiac cath due to resolution of symptoms, patient's age, and risk of complications  Continue Plavix, Coumadin (in place of aspirin due to LV thrombus)  Continue high intensity statin  Started GDMT with Metoprolol and Losartan however now has OLMAN, holding Losartan  Monitor on tele
- c/w plavix and metoprolol  - resume losartan 3/13
Patient presenting from OSH with late presenting MI  Cards consulted, no cardiac cath due to resolution of symptoms, patient's age, and risk of complications  Continue Plavix, no aspirin (to avoid triple therapy)  Continue high intensity statin  c/w GDMT with Metoprolol and Losartan however now has OLMAN, holding Losartan  Monitor on tele
Patient presenting from OSH with late presenting MI  Cards consulted, no cardiac cath due to resolution of symptoms, patient's age, and risk of complications  Continue Plavix, heparin drip (for LV thrombus, no need for further aspirin to avoid triple therapy)  Continue high intensity statin  Start GDMT with Metoprolol and Losartan  Monitor on tele
Patient presenting from OSH with late presenting MI  Cards consulted, no cardiac cath due to resolution of symptoms, patient's age, and risk of complications  Continue Plavix, heparin drip (for LV thrombus, no need for further aspirin to avoid triple therapy)  Continue high intensity statin  Start GDMT with Metoprolol and Losartan  Monitor on tele

## 2024-03-12 NOTE — PROGRESS NOTE ADULT - PROBLEM SELECTOR PLAN 3
Continue home Flomax 0.4mg daily
Continue home Flomax 0.4mg daily
Cr increase from 1.04 on 3/8 to 1.77 today   Per cards hold Losartan  s/p IVF bolus 3/11, cr worsened  renal US pending
Cr increase from 1.04 on 3/8 to 1.42 today due to Losartan use vs dehydration  Per cards hold Losartan  Will give small IVF today and recheck BMP in AM
- AK improving  - renal US unremarkable   - resume losartan 3/13  - resume farxiga 3/15

## 2024-03-12 NOTE — PROGRESS NOTE ADULT - PROBLEM SELECTOR PLAN 2
Continue Warfarin  INR increased from 1.03 to 1.90 to 3.44 after 2 doses of 5mg and 1 dose of 2.5mg, seems sensitive to it, hold today's dose and reassess tomorrow but would start at lower dose
INR 3.58  - hold coumadin for today  - start coumadin 2 mg on 3/12
Continue heparin drip bridge to Warfarin, dose 5mg tonight
Continue heparin drip bridge to Warfarin, dose 2.5mg tonight
INR 3.44  - will give 2 mg of coumadin today

## 2024-03-12 NOTE — PROGRESS NOTE ADULT - PROBLEM SELECTOR PROBLEM 2
Left ventricular thrombus

## 2024-07-30 PROBLEM — E78.5 HYPERLIPIDEMIA, UNSPECIFIED: Chronic | Status: ACTIVE | Noted: 2024-03-07

## 2024-09-10 PROBLEM — Z00.00 ENCOUNTER FOR PREVENTIVE HEALTH EXAMINATION: Status: ACTIVE | Noted: 2024-09-10

## 2024-09-23 ENCOUNTER — OUTPATIENT (OUTPATIENT)
Dept: OUTPATIENT SERVICES | Facility: HOSPITAL | Age: 84
LOS: 1 days | End: 2024-09-23

## 2024-09-23 ENCOUNTER — APPOINTMENT (OUTPATIENT)
Dept: MRI IMAGING | Facility: CLINIC | Age: 84
End: 2024-09-23

## 2024-09-23 PROCEDURE — 75561 CARDIAC MRI FOR MORPH W/DYE: CPT | Mod: 26

## 2024-09-23 PROCEDURE — 75565 CARD MRI VELOC FLOW MAPPING: CPT | Mod: 26
